# Patient Record
Sex: MALE | Race: WHITE | Employment: UNEMPLOYED | URBAN - METROPOLITAN AREA
[De-identification: names, ages, dates, MRNs, and addresses within clinical notes are randomized per-mention and may not be internally consistent; named-entity substitution may affect disease eponyms.]

---

## 2017-08-19 ENCOUNTER — HOSPITAL ENCOUNTER (INPATIENT)
Age: 82
LOS: 5 days | Discharge: HOME | DRG: 872 | End: 2017-08-25
Attending: EMERGENCY MEDICINE | Admitting: INTERNAL MEDICINE
Payer: MEDICARE

## 2017-08-19 ENCOUNTER — APPOINTMENT (OUTPATIENT)
Dept: GENERAL RADIOLOGY | Age: 82
DRG: 872 | End: 2017-08-19
Attending: STUDENT IN AN ORGANIZED HEALTH CARE EDUCATION/TRAINING PROGRAM
Payer: MEDICARE

## 2017-08-19 DIAGNOSIS — L03.115 CELLULITIS OF RIGHT LOWER EXTREMITY: ICD-10-CM

## 2017-08-19 DIAGNOSIS — A41.9 SEPSIS, DUE TO UNSPECIFIED ORGANISM: Primary | ICD-10-CM

## 2017-08-19 LAB
ALBUMIN SERPL-MCNC: 3.1 G/DL (ref 3.2–4.6)
ALBUMIN/GLOB SERPL: 0.7 {RATIO} (ref 1.2–3.5)
ALP SERPL-CCNC: 86 U/L (ref 50–136)
ALT SERPL-CCNC: 17 U/L (ref 12–65)
ANION GAP SERPL CALC-SCNC: 10 MMOL/L (ref 7–16)
AST SERPL-CCNC: 26 U/L (ref 15–37)
BASOPHILS # BLD: 0 K/UL (ref 0–0.2)
BASOPHILS NFR BLD: 0 % (ref 0–2)
BILIRUB SERPL-MCNC: 2.2 MG/DL (ref 0.2–1.1)
BUN SERPL-MCNC: 26 MG/DL (ref 8–23)
CALCIUM SERPL-MCNC: 9.2 MG/DL (ref 8.3–10.4)
CHLORIDE SERPL-SCNC: 99 MMOL/L (ref 98–107)
CO2 SERPL-SCNC: 28 MMOL/L (ref 21–32)
CREAT SERPL-MCNC: 1.36 MG/DL (ref 0.8–1.5)
DIFFERENTIAL METHOD BLD: ABNORMAL
EOSINOPHIL # BLD: 0 K/UL (ref 0–0.8)
EOSINOPHIL NFR BLD: 0 % (ref 0.5–7.8)
ERYTHROCYTE [DISTWIDTH] IN BLOOD BY AUTOMATED COUNT: 16.4 % (ref 11.9–14.6)
GLOBULIN SER CALC-MCNC: 4.4 G/DL (ref 2.3–3.5)
GLUCOSE SERPL-MCNC: 94 MG/DL (ref 65–100)
HCT VFR BLD AUTO: 40 % (ref 41.1–50.3)
HGB BLD-MCNC: 13.5 G/DL (ref 13.6–17.2)
IMM GRANULOCYTES # BLD: 0.2 K/UL (ref 0–0.5)
IMM GRANULOCYTES NFR BLD: 0.8 % (ref 0–5)
LACTATE BLD-SCNC: 1.8 MMOL/L (ref 0.5–1.9)
LYMPHOCYTES # BLD: 0.3 K/UL (ref 0.5–4.6)
LYMPHOCYTES NFR BLD: 1 % (ref 13–44)
MCH RBC QN AUTO: 27.2 PG (ref 26.1–32.9)
MCHC RBC AUTO-ENTMCNC: 33.8 G/DL (ref 31.4–35)
MCV RBC AUTO: 80.6 FL (ref 79.6–97.8)
MONOCYTES # BLD: 0.6 K/UL (ref 0.1–1.3)
MONOCYTES NFR BLD: 3 % (ref 4–12)
NEUTS SEG # BLD: 20.5 K/UL (ref 1.7–8.2)
NEUTS SEG NFR BLD: 95 % (ref 43–78)
PLATELET # BLD AUTO: 154 K/UL (ref 150–450)
PMV BLD AUTO: 9.9 FL (ref 10.8–14.1)
POTASSIUM SERPL-SCNC: 3.6 MMOL/L (ref 3.5–5.1)
PROCALCITONIN SERPL-MCNC: 5.6 NG/ML
PROT SERPL-MCNC: 7.5 G/DL (ref 6.3–8.2)
RBC # BLD AUTO: 4.96 M/UL (ref 4.23–5.67)
SODIUM SERPL-SCNC: 137 MMOL/L (ref 136–145)
TROPONIN I BLD-MCNC: 0.05 NG/ML (ref 0–0.08)
WBC # BLD AUTO: 21.6 K/UL (ref 4.3–11.1)

## 2017-08-19 PROCEDURE — 87205 SMEAR GRAM STAIN: CPT | Performed by: STUDENT IN AN ORGANIZED HEALTH CARE EDUCATION/TRAINING PROGRAM

## 2017-08-19 PROCEDURE — 84145 PROCALCITONIN (PCT): CPT | Performed by: STUDENT IN AN ORGANIZED HEALTH CARE EDUCATION/TRAINING PROGRAM

## 2017-08-19 PROCEDURE — 87641 MR-STAPH DNA AMP PROBE: CPT | Performed by: STUDENT IN AN ORGANIZED HEALTH CARE EDUCATION/TRAINING PROGRAM

## 2017-08-19 PROCEDURE — 83605 ASSAY OF LACTIC ACID: CPT

## 2017-08-19 PROCEDURE — 87186 SC STD MICRODIL/AGAR DIL: CPT | Performed by: STUDENT IN AN ORGANIZED HEALTH CARE EDUCATION/TRAINING PROGRAM

## 2017-08-19 PROCEDURE — 96374 THER/PROPH/DIAG INJ IV PUSH: CPT | Performed by: STUDENT IN AN ORGANIZED HEALTH CARE EDUCATION/TRAINING PROGRAM

## 2017-08-19 PROCEDURE — 71010 XR CHEST PORT: CPT

## 2017-08-19 PROCEDURE — 96361 HYDRATE IV INFUSION ADD-ON: CPT | Performed by: STUDENT IN AN ORGANIZED HEALTH CARE EDUCATION/TRAINING PROGRAM

## 2017-08-19 PROCEDURE — 87040 BLOOD CULTURE FOR BACTERIA: CPT | Performed by: STUDENT IN AN ORGANIZED HEALTH CARE EDUCATION/TRAINING PROGRAM

## 2017-08-19 PROCEDURE — 85025 COMPLETE CBC W/AUTO DIFF WBC: CPT | Performed by: STUDENT IN AN ORGANIZED HEALTH CARE EDUCATION/TRAINING PROGRAM

## 2017-08-19 PROCEDURE — 93005 ELECTROCARDIOGRAM TRACING: CPT | Performed by: STUDENT IN AN ORGANIZED HEALTH CARE EDUCATION/TRAINING PROGRAM

## 2017-08-19 PROCEDURE — 87077 CULTURE AEROBIC IDENTIFY: CPT | Performed by: STUDENT IN AN ORGANIZED HEALTH CARE EDUCATION/TRAINING PROGRAM

## 2017-08-19 PROCEDURE — 84484 ASSAY OF TROPONIN QUANT: CPT

## 2017-08-19 PROCEDURE — 99284 EMERGENCY DEPT VISIT MOD MDM: CPT | Performed by: STUDENT IN AN ORGANIZED HEALTH CARE EDUCATION/TRAINING PROGRAM

## 2017-08-19 PROCEDURE — 81001 URINALYSIS AUTO W/SCOPE: CPT | Performed by: EMERGENCY MEDICINE

## 2017-08-19 PROCEDURE — 80053 COMPREHEN METABOLIC PANEL: CPT | Performed by: STUDENT IN AN ORGANIZED HEALTH CARE EDUCATION/TRAINING PROGRAM

## 2017-08-19 PROCEDURE — 77030027138 HC INCENT SPIROMETER -A

## 2017-08-19 RX ORDER — VANCOMYCIN 2 GRAM/500 ML IN 0.9 % SODIUM CHLORIDE INTRAVENOUS
2000 ONCE
Status: COMPLETED | OUTPATIENT
Start: 2017-08-19 | End: 2017-08-20

## 2017-08-19 NOTE — IP AVS SNAPSHOT
[address] 
[phone] Patient: Anna Rivera MRN: [id number] SYV:3/5/0251 You are allergic to the following Allergen Reactions Demerol (Meperidine) Nausea and Vomiting Recent Documentation Height Weight BMI  
  
  
 1.956 m 120.2 kg 31.42 kg/m2 Unresulted Labs Order Current Status CULTURE, BLOOD Preliminary result Emergency Contacts Name Discharge Info Relation Home Work [facility]   [phone] About your hospitalization You were admitted on:  August 20, 2017 You last received care in the:  [facility] You were discharged on:  August 25, 2017 Unit phone number:  [phone] Why you were hospitalized Your primary diagnosis was:  Cellulitis Of Right Lower Leg Your diagnoses also included:  Bacteremia, Umbilical Hernia, Hypertension, Chronic Atrial Fibrillation (Hcc), Sepsis Due To Cellulitis (Hcc) Providers Seen During Your Hospitalizations Provider Role Specialty Primary office phone Mark Farmer MD Attending Provider Emergency Medicine [phone] Ren Hoyt MD Attending Provider Internal Medicine [phone] Your Primary Care Physician (PCP) Primary Care Physician Office Phone Office Fax UNKNOWN, PROVIDER ** None ** ** None ** Follow-up Information Follow up With Details Comments Contact Info Papo Foster MD On 8/30/2017 10:15 am  [address] [address] 
[phone] Current Discharge Medication List  
  
START taking these medications Dose & Instructions Dispensing Information Comments Morning Noon Evening Bedtime  
 clindamycin 300 mg capsule Commonly known as:  CLEOCIN Dose:  300 mg Take 1 Cap by mouth three (3) times daily for 8 days. Quantity:  24 Cap Refills:  0 Lactobacillus Acidoph & Bulgar 1 million cell Tab tablet Commonly known as:  Gisela Joyce Dose:  1 Tab Take 1 Tab by mouth two (2) times a day for 10 days. Quantity:  20 Tab Refills:  0 CONTINUE these medications which have NOT CHANGED Dose & Instructions Dispensing Information Comments Morning Noon Evening Bedtime * COUMADIN 7.5 mg tablet Generic drug:  warfarin Notes to Patient:  As schedule Dose:  10 mg Take 10 mg by mouth DIALYSIS MON, WED & FRI. Refills:  0  
     
   
   
   
  
 * warfarin 7.5 mg tablet Commonly known as:  COUMADIN Notes to Patient:  As schedule Dose:  7.5 mg Take 7.5 mg by mouth every Tuesday, Thursday, Saturday & Sunday. Refills:  0  
     
   
   
   
  
 diphenhydrAMINE-zinc acetate 2%-0.1% 2-0.1 % topical cream  
Commonly known as:  BENADRYL ITCH STOPPING Notes to Patient:  Take on as needed schedule Apply  to affected area two (2) times daily as needed for Itching. Quantity:  30 g Refills:  0  
     
   
   
   
  
 gabapentin 100 mg capsule Commonly known as:  NEURONTIN Dose:  100 mg Take 100 mg by mouth nightly. 1-4 tabs Refills:  0  
     
   
   
   
  
  
 hydrocortisone 2.5 % topical cream  
Commonly known as:  HYTONE Apply  to affected area two (2) times a day. use thin layer Quantity:  30 g Refills:  0  
     
  
   
   
   
  
  
 LASIX 20 mg tablet Generic drug:  furosemide Dose:  20 mg Take 20 mg by mouth two (2) times a day. Refills:  0  
     
  
   
   
  
   
  
 lisinopril 20 mg tablet Commonly known as:  Karn Desanctis Dose:  10 mg Take 10 mg by mouth two (2) times a day. Refills:  0 LOPRESSOR 50 mg tablet Generic drug:  metoprolol tartrate Dose:  25 mg Take 25 mg by mouth two (2) times a day. Refills:  0 NORVASC 10 mg tablet Generic drug:  amLODIPine Dose:  10 mg Take 10 mg by mouth daily. Refills:  0  
     
  
   
   
   
  
 oxyCODONE IR 15 mg immediate release tablet Commonly known as:  OXY-IR Notes to Patient:  Take on as needed schedule Dose:  15 mg Take 15 mg by mouth every eight (8) hours as needed. Refills:  0 PriLOSEC 20 mg capsule Generic drug:  omeprazole Dose:  20 mg Take 20 mg by mouth daily. Refills:  0  
     
  
   
   
   
  
 * Notice: This list has 2 medication(s) that are the same as other medications prescribed for you. Read the directions carefully, and ask your doctor or other care provider to review them with you. Where to Get Your Medications These medications were sent to [facility] [address] [place], [address]  [address] [facility], [address] [place] [address] Phone:  [phone]  
  clindamycin 300 mg capsule Lactobacillus Acidoph & Bulgar 1 million cell Tab tablet Discharge Instructions DISCHARGE SUMMARY from Nurse The following personal items are in your possession at time of discharge: 
 
Dental Appliances: None Home Medications: None Jewelry: None Clothing: With patient, Socks, Shirt, Pants, Footwear Other Valuables: At bedside PATIENT INSTRUCTIONS: 
 
After general anesthesia or intravenous sedation, for 24 hours or while taking prescription Narcotics: · Limit your activities · Do not drive and operate hazardous machinery · Do not make important personal or business decisions · Do  not drink alcoholic beverages · If you have not urinated within 8 hours after discharge, please contact your surgeon on call. Report the following to your surgeon: 
· Excessive pain, swelling, redness or odor of or around the surgical area · Temperature over 100.5 · Nausea and vomiting lasting longer than 4 hours or if unable to take medications · Any signs of decreased circulation or nerve impairment to extremity: change in color, persistent  numbness, tingling, coldness or increase pain · Any questions What to do at Home: 
Recommended activity: Activity as tolerated, per MD instructions If you experience any of the following symptoms fever > 100.5, nausea, vomiting, pain, chest pain, shortness of breath please follow up with MD. 
 
 
*  Please give a list of your current medications to your Primary Care Provider. *  Please update this list whenever your medications are discontinued, doses are 
    changed, or new medications (including over-the-counter products) are added. *  Please carry medication information at all times in case of emergency situations. These are general instructions for a healthy lifestyle: No smoking/ No tobacco products/ Avoid exposure to second hand smoke Surgeon General's Warning:  Quitting smoking now greatly reduces serious risk to your health. Obesity, smoking, and sedentary lifestyle greatly increases your risk for illness A healthy diet, regular physical exercise & weight monitoring are important for maintaining a healthy lifestyle You may be retaining fluid if you have a history of heart failure or if you experience any of the following symptoms:  Weight gain of 3 pounds or more overnight or 5 pounds in a week, increased swelling in our hands or feet or shortness of breath while lying flat in bed. Please call your doctor as soon as you notice any of these symptoms; do not wait until your next office visit. Recognize signs and symptoms of STROKE: 
 
F-face looks uneven A-arms unable to move or move unevenly S-speech slurred or non-existent T-time-call 911 as soon as signs and symptoms begin-DO NOT go Back to bed or wait to see if you get better-TIME IS BRAIN. Warning Signs of HEART ATTACK Call 911 if you have these symptoms: ? Chest discomfort. Most heart attacks involve discomfort in the center of the chest that lasts more than a few minutes, or that goes away and comes back. It can feel like uncomfortable pressure, squeezing, fullness, or pain. ? Discomfort in other areas of the upper body. Symptoms can include pain or discomfort in one or both arms, the back, neck, jaw, or stomach. ? Shortness of breath with or without chest discomfort. ? Other signs may include breaking out in a cold sweat, nausea, or lightheadedness. Don't wait more than five minutes to call [address]! Fast action can save your life. Calling 911 is almost always the fastest way to get lifesaving treatment. Emergency Medical Services staff can begin treatment when they arrive  up to an hour sooner than if someone gets to the hospital by car. The discharge information has been reviewed with the patient. The patient verbalized understanding. Discharge medications reviewed with the patient and appropriate educational materials and side effects teaching were provided. Cellulitis: Care Instructions Your Care Instructions Cellulitis is a skin infection. It often occurs after a break in the skin from a scrape, cut, bite, or puncture, or after a rash. The doctor has checked you carefully, but problems can develop later. If you notice any problems or new symptoms, get medical treatment right away. Follow-up care is a key part of your treatment and safety. Be sure to make and go to all appointments, and call your doctor if you are having problems. It's also a good idea to know your test results and keep a list of the medicines you take. How can you care for yourself at home? · Take your antibiotics as directed. Do not stop taking them just because you feel better. You need to take the full course of antibiotics. · Prop up the infected area on pillows to reduce pain and swelling.  Try to keep the area above the level of your heart as often as you can. · If your doctor told you how to care for your wound, follow your doctor's instructions. If you did not get instructions, follow this general advice: ¨ Wash the wound with clean water 2 times a day. Don't use hydrogen peroxide or alcohol, which can slow healing. ¨ You may cover the wound with a thin layer of petroleum jelly, such as Vaseline, and a nonstick bandage. ¨ Apply more petroleum jelly and replace the bandage as needed. · Be safe with medicines. Take pain medicines exactly as directed. ¨ If the doctor gave you a prescription medicine for pain, take it as prescribed. ¨ If you are not taking a prescription pain medicine, ask your doctor if you can take an over-the-counter medicine. To prevent cellulitis in the future · Try to prevent cuts, scrapes, or other injuries to your skin. Cellulitis most often occurs where there is a break in the skin. · If you get a scrape, cut, mild burn, or bite, wash the wound with clean water as soon as you can to help avoid infection. Don't use hydrogen peroxide or alcohol, which can slow healing. · If you have swelling in your legs (edema), support stockings and good skin care may help prevent leg sores and cellulitis. · Take care of your feet, especially if you have diabetes or other conditions that increase the risk of infection. Wear shoes and socks. Do not go barefoot. If you have athlete's foot or other skin problems on your feet, talk to your doctor about how to treat them. When should you call for help? Call your doctor now or seek immediate medical care if: 
· You have signs that your infection is getting worse, such as: 
¨ Increased pain, swelling, warmth, or redness. ¨ Red streaks leading from the area. ¨ Pus draining from the area. ¨ A fever. · You get a rash. Watch closely for changes in your health, and be sure to contact your doctor if: · You are not getting better after 1 day (24 hours). · You do not get better as expected. Where can you learn more? Go to http://mil-georgette.info/. Maico Sullivan in the search box to learn more about \"Cellulitis: Care Instructions. \" Current as of: October 13, 2016 Content Version: 11.3 © 6631-8904 [employer]. Care instructions adapted under license by [employer] (which disclaims liability or warranty for this information). If you have questions about a medical condition or this instruction, always ask your healthcare professional. [place] [address] any warranty or liability for your use of this information. Discharge Orders None [employer] Announcement We are excited to announce that we are making your provider's discharge notes available to you in [employer]. You will see these notes when they are completed and signed by the physician that discharged you from your recent hospital stay. If you have any questions or concerns about any information you see in [employer], please call the Health Information Department where you were seen or reach out to your Primary Care Provider for more information about your plan of care. Introducing [facility]! Yuliana Tobar introduces [employer] patient portal. Now you can access parts of your medical record, email your doctor's office, and request medication refills online. 1. In your internet browser, go to [email or web address] 2. Click on the First Time User? Click Here link in the Sign In box. You will see the New Member Sign Up page. 3. Enter your [employer] Access Code exactly as it appears below. You will not need to use this code after youve completed the sign-up process. If you do not sign up before the expiration date, you must request a new code. · [employer] Access Code: [email or web address] Expires: 11/17/2017 11:23 PM 
 
 4. Enter the last four digits of your Social Security Number (xxxx) and Date of Birth (mm/dd/yyyy) as indicated and click Submit. You will be taken to the next sign-up page. 5. Create a AXADO ID. This will be your AXADO login ID and cannot be changed, so think of one that is secure and easy to remember. 6. Create a AXADO password. You can change your password at any time. 7. Enter your Password Reset Question and Answer. This can be used at a later time if you forget your password. 8. Enter your e-mail address. You will receive e-mail notification when new information is available in [address] 9. Click Sign Up. You can now view and download portions of your medical record. 10. Click the Download Summary menu link to download a portable copy of your medical information. If you have questions, please visit the Frequently Asked Questions section of the AXADO website. Remember, AX[employer]O is NOT to be used for urgent needs. For medical emergencies, dial 911. Now available from your iPhone and Android! General Information Please provide this summary of care documentation to your next provider. Patient Signature:  ____________________________________________________________ Date:  ____________________________________________________________  
  
Maynor Endo Provider Signature:  ____________________________________________________________ Date:  ____________________________________________________________

## 2017-08-20 ENCOUNTER — APPOINTMENT (OUTPATIENT)
Dept: ULTRASOUND IMAGING | Age: 82
DRG: 872 | End: 2017-08-20
Attending: INTERNAL MEDICINE
Payer: MEDICARE

## 2017-08-20 PROBLEM — L03.90 CELLULITIS: Status: ACTIVE | Noted: 2017-08-20

## 2017-08-20 PROBLEM — R78.81 BACTEREMIA: Status: ACTIVE | Noted: 2017-08-20

## 2017-08-20 LAB
APPEARANCE UR: CLEAR
ATRIAL RATE: 131 BPM
BACTERIA URNS QL MICRO: 0 /HPF
BILIRUB UR QL: NEGATIVE
CALCULATED R AXIS, ECG10: 82 DEGREES
CALCULATED T AXIS, ECG11: 74 DEGREES
CASTS URNS QL MICRO: ABNORMAL /LPF
COLOR UR: YELLOW
DIAGNOSIS, 93000: NORMAL
EPI CELLS #/AREA URNS HPF: ABNORMAL /HPF
GLUCOSE UR STRIP.AUTO-MCNC: NEGATIVE MG/DL
HGB UR QL STRIP: ABNORMAL
INR PPP: 1.6 (ref 0.9–1.2)
KETONES UR QL STRIP.AUTO: NEGATIVE MG/DL
LEUKOCYTE ESTERASE UR QL STRIP.AUTO: NEGATIVE
NITRITE UR QL STRIP.AUTO: NEGATIVE
PH UR STRIP: 7.5 [PH] (ref 5–9)
PROT UR STRIP-MCNC: 30 MG/DL
PROTHROMBIN TIME: 17.4 SEC (ref 9.6–12)
Q-T INTERVAL, ECG07: 300 MS
QRS DURATION, ECG06: 88 MS
QTC CALCULATION (BEZET), ECG08: 408 MS
RBC #/AREA URNS HPF: ABNORMAL /HPF
SP GR UR REFRACTOMETRY: 1.02 (ref 1–1.02)
UROBILINOGEN UR QL STRIP.AUTO: 1 EU/DL (ref 0.2–1)
VENTRICULAR RATE, ECG03: 111 BPM
WBC URNS QL MICRO: ABNORMAL /HPF

## 2017-08-20 PROCEDURE — 85610 PROTHROMBIN TIME: CPT | Performed by: INTERNAL MEDICINE

## 2017-08-20 PROCEDURE — 36415 COLL VENOUS BLD VENIPUNCTURE: CPT | Performed by: INTERNAL MEDICINE

## 2017-08-20 PROCEDURE — 74011250636 HC RX REV CODE- 250/636: Performed by: INTERNAL MEDICINE

## 2017-08-20 PROCEDURE — 74011250636 HC RX REV CODE- 250/636: Performed by: EMERGENCY MEDICINE

## 2017-08-20 PROCEDURE — 74011250637 HC RX REV CODE- 250/637: Performed by: INTERNAL MEDICINE

## 2017-08-20 PROCEDURE — 74011250637 HC RX REV CODE- 250/637: Performed by: HOSPITALIST

## 2017-08-20 PROCEDURE — 74011000258 HC RX REV CODE- 258: Performed by: HOSPITALIST

## 2017-08-20 PROCEDURE — 65270000029 HC RM PRIVATE

## 2017-08-20 PROCEDURE — 74011000258 HC RX REV CODE- 258: Performed by: INTERNAL MEDICINE

## 2017-08-20 PROCEDURE — 74011250636 HC RX REV CODE- 250/636: Performed by: HOSPITALIST

## 2017-08-20 PROCEDURE — 93970 EXTREMITY STUDY: CPT

## 2017-08-20 PROCEDURE — 74011250637 HC RX REV CODE- 250/637: Performed by: EMERGENCY MEDICINE

## 2017-08-20 RX ORDER — NALOXONE HYDROCHLORIDE 0.4 MG/ML
0.4 INJECTION, SOLUTION INTRAMUSCULAR; INTRAVENOUS; SUBCUTANEOUS AS NEEDED
Status: DISCONTINUED | OUTPATIENT
Start: 2017-08-20 | End: 2017-08-25 | Stop reason: HOSPADM

## 2017-08-20 RX ORDER — SODIUM CHLORIDE 9 MG/ML
50 INJECTION, SOLUTION INTRAVENOUS CONTINUOUS
Status: DISPENSED | OUTPATIENT
Start: 2017-08-20 | End: 2017-08-21

## 2017-08-20 RX ORDER — PANTOPRAZOLE SODIUM 40 MG/1
40 TABLET, DELAYED RELEASE ORAL
Status: DISCONTINUED | OUTPATIENT
Start: 2017-08-20 | End: 2017-08-25 | Stop reason: HOSPADM

## 2017-08-20 RX ORDER — LORAZEPAM 1 MG/1
1 TABLET ORAL
Status: DISCONTINUED | OUTPATIENT
Start: 2017-08-20 | End: 2017-08-25 | Stop reason: HOSPADM

## 2017-08-20 RX ORDER — UREA 10 %
1 LOTION (ML) TOPICAL 2 TIMES DAILY
Status: DISCONTINUED | OUTPATIENT
Start: 2017-08-20 | End: 2017-08-25 | Stop reason: HOSPADM

## 2017-08-20 RX ORDER — ACETAMINOPHEN 325 MG/1
650 TABLET ORAL
Status: DISCONTINUED | OUTPATIENT
Start: 2017-08-20 | End: 2017-08-25 | Stop reason: HOSPADM

## 2017-08-20 RX ORDER — HYDROCODONE BITARTRATE AND ACETAMINOPHEN 5; 325 MG/1; MG/1
1 TABLET ORAL
Status: DISCONTINUED | OUTPATIENT
Start: 2017-08-20 | End: 2017-08-25 | Stop reason: HOSPADM

## 2017-08-20 RX ORDER — LISINOPRIL 5 MG/1
10 TABLET ORAL 2 TIMES DAILY
Status: DISCONTINUED | OUTPATIENT
Start: 2017-08-20 | End: 2017-08-25 | Stop reason: HOSPADM

## 2017-08-20 RX ORDER — VANCOMYCIN 2 GRAM/500 ML IN 0.9 % SODIUM CHLORIDE INTRAVENOUS
2000 EVERY 24 HOURS
Status: DISCONTINUED | OUTPATIENT
Start: 2017-08-21 | End: 2017-08-20

## 2017-08-20 RX ORDER — AMOXICILLIN 250 MG
2 CAPSULE ORAL
Status: DISCONTINUED | OUTPATIENT
Start: 2017-08-20 | End: 2017-08-25 | Stop reason: HOSPADM

## 2017-08-20 RX ORDER — DIAPER,BRIEF,INFANT-TODD,DISP
EACH MISCELLANEOUS 2 TIMES DAILY
Status: DISCONTINUED | OUTPATIENT
Start: 2017-08-20 | End: 2017-08-25 | Stop reason: HOSPADM

## 2017-08-20 RX ORDER — ACETAMINOPHEN 500 MG
1000 TABLET ORAL
Status: COMPLETED | OUTPATIENT
Start: 2017-08-20 | End: 2017-08-20

## 2017-08-20 RX ORDER — HEPARIN SODIUM 5000 [USP'U]/ML
5000 INJECTION, SOLUTION INTRAVENOUS; SUBCUTANEOUS EVERY 8 HOURS
Status: DISCONTINUED | OUTPATIENT
Start: 2017-08-20 | End: 2017-08-20

## 2017-08-20 RX ORDER — SODIUM CHLORIDE 0.9 % (FLUSH) 0.9 %
5-10 SYRINGE (ML) INJECTION EVERY 8 HOURS
Status: DISCONTINUED | OUTPATIENT
Start: 2017-08-20 | End: 2017-08-25 | Stop reason: HOSPADM

## 2017-08-20 RX ORDER — ONDANSETRON 2 MG/ML
4 INJECTION INTRAMUSCULAR; INTRAVENOUS
Status: DISCONTINUED | OUTPATIENT
Start: 2017-08-20 | End: 2017-08-25 | Stop reason: HOSPADM

## 2017-08-20 RX ORDER — ZOLPIDEM TARTRATE 5 MG/1
5 TABLET ORAL
Status: DISCONTINUED | OUTPATIENT
Start: 2017-08-20 | End: 2017-08-25 | Stop reason: HOSPADM

## 2017-08-20 RX ORDER — SODIUM CHLORIDE 0.9 % (FLUSH) 0.9 %
5-10 SYRINGE (ML) INJECTION AS NEEDED
Status: DISCONTINUED | OUTPATIENT
Start: 2017-08-20 | End: 2017-08-25 | Stop reason: HOSPADM

## 2017-08-20 RX ORDER — HYDROMORPHONE HYDROCHLORIDE 1 MG/ML
0.5 INJECTION, SOLUTION INTRAMUSCULAR; INTRAVENOUS; SUBCUTANEOUS
Status: DISCONTINUED | OUTPATIENT
Start: 2017-08-20 | End: 2017-08-22

## 2017-08-20 RX ORDER — VANCOMYCIN 2 GRAM/500 ML IN 0.9 % SODIUM CHLORIDE INTRAVENOUS
2000 EVERY 24 HOURS
Status: DISCONTINUED | OUTPATIENT
Start: 2017-08-21 | End: 2017-08-21

## 2017-08-20 RX ORDER — DIPHENHYDRAMINE HYDROCHLORIDE 50 MG/ML
12.5 INJECTION, SOLUTION INTRAMUSCULAR; INTRAVENOUS
Status: DISCONTINUED | OUTPATIENT
Start: 2017-08-20 | End: 2017-08-25 | Stop reason: HOSPADM

## 2017-08-20 RX ORDER — METOPROLOL TARTRATE 25 MG/1
25 TABLET, FILM COATED ORAL 2 TIMES DAILY
Status: DISCONTINUED | OUTPATIENT
Start: 2017-08-20 | End: 2017-08-25 | Stop reason: HOSPADM

## 2017-08-20 RX ORDER — AMLODIPINE BESYLATE 10 MG/1
10 TABLET ORAL DAILY
Status: DISCONTINUED | OUTPATIENT
Start: 2017-08-20 | End: 2017-08-25 | Stop reason: HOSPADM

## 2017-08-20 RX ORDER — HEPARIN SODIUM 5000 [USP'U]/ML
5000 INJECTION, SOLUTION INTRAVENOUS; SUBCUTANEOUS EVERY 8 HOURS
Status: DISCONTINUED | OUTPATIENT
Start: 2017-08-20 | End: 2017-08-20 | Stop reason: SDUPTHER

## 2017-08-20 RX ADMIN — SODIUM CHLORIDE 1000 ML: 900 INJECTION, SOLUTION INTRAVENOUS at 00:41

## 2017-08-20 RX ADMIN — HYDROCORTISONE: 5 CREAM TOPICAL at 08:11

## 2017-08-20 RX ADMIN — PIPERACILLIN SODIUM,TAZOBACTAM SODIUM 3.38 G: 3; .375 INJECTION, POWDER, FOR SOLUTION INTRAVENOUS at 03:25

## 2017-08-20 RX ADMIN — LISINOPRIL 10 MG: 5 TABLET ORAL at 08:06

## 2017-08-20 RX ADMIN — Medication 5 ML: at 12:05

## 2017-08-20 RX ADMIN — ZOLPIDEM TARTRATE 5 MG: 5 TABLET ORAL at 22:04

## 2017-08-20 RX ADMIN — LACTOBACILLUS TAB 1 TABLET: TAB at 17:27

## 2017-08-20 RX ADMIN — METOPROLOL TARTRATE 25 MG: 25 TABLET, FILM COATED ORAL at 08:06

## 2017-08-20 RX ADMIN — HYDROCORTISONE: 5 CREAM TOPICAL at 17:27

## 2017-08-20 RX ADMIN — METOPROLOL TARTRATE 25 MG: 25 TABLET, FILM COATED ORAL at 17:27

## 2017-08-20 RX ADMIN — AMLODIPINE BESYLATE 10 MG: 10 TABLET ORAL at 08:06

## 2017-08-20 RX ADMIN — ACETAMINOPHEN 1000 MG: 500 TABLET, FILM COATED ORAL at 01:17

## 2017-08-20 RX ADMIN — SODIUM CHLORIDE 75 ML/HR: 900 INJECTION, SOLUTION INTRAVENOUS at 03:21

## 2017-08-20 RX ADMIN — ACETAMINOPHEN 650 MG: 325 TABLET ORAL at 17:36

## 2017-08-20 RX ADMIN — Medication 10 ML: at 20:42

## 2017-08-20 RX ADMIN — WARFARIN SODIUM 7.5 MG: 5 TABLET ORAL at 21:59

## 2017-08-20 RX ADMIN — Medication 5 ML: at 03:23

## 2017-08-20 RX ADMIN — PIPERACILLIN SODIUM,TAZOBACTAM SODIUM 3.38 G: 3; .375 INJECTION, POWDER, FOR SOLUTION INTRAVENOUS at 20:41

## 2017-08-20 RX ADMIN — LISINOPRIL 10 MG: 5 TABLET ORAL at 17:27

## 2017-08-20 RX ADMIN — VANCOMYCIN HYDROCHLORIDE 2000 MG: 10 INJECTION, POWDER, LYOPHILIZED, FOR SOLUTION INTRAVENOUS at 01:07

## 2017-08-20 RX ADMIN — PANTOPRAZOLE SODIUM 40 MG: 40 TABLET, DELAYED RELEASE ORAL at 08:06

## 2017-08-20 RX ADMIN — PIPERACILLIN SODIUM,TAZOBACTAM SODIUM 3.38 G: 3; .375 INJECTION, POWDER, FOR SOLUTION INTRAVENOUS at 12:04

## 2017-08-20 NOTE — ED NOTES
TRANSFER - OUT REPORT:    Verbal report given to Moira Sharpe RN on Rima Alonso  being transferred to 2nd Floor for routine progression of care       Report consisted of patients Situation, Background, Assessment and   Recommendations(SBAR). Information from the following report(s) SBAR was reviewed with the receiving nurse. Lines:   Peripheral IV 08/20/17 Left Antecubital (Active)       Peripheral IV 08/20/17 Right Antecubital (Active)        Opportunity for questions and clarification was provided.       Patient transported with:   [employer]

## 2017-08-20 NOTE — ED PROVIDER NOTES
HPI Comments: Presents via EMS with complaint of fever and right leg pain. Patient has been sleepy. First of which is unknown and is no caregiver is here and he is a poor historian. Patient reports right leg pain and that he lives at home with his son. He denies wearing oxygen at home. He falls asleep easily and doesn't answer all of my questions. Patient is a 80 y.o. male presenting with fever. The history is provided by the patient and the EMS personnel. The history is limited by the condition of the patient and the absence of a caregiver. Fever    This is a new problem. The current episode started 12 to 24 hours ago. The problem occurs constantly. The problem has not changed since onset. Pertinent negatives include no chest pain, no shortness of breath and no urinary symptoms. Past Medical History:   Diagnosis Date    GERD (gastroesophageal reflux disease)     HTN (hypertension)     Renal insufficiency     Renal stone        Past Surgical History:   Procedure Laterality Date    HX BLADDER SUSPENSION      tack to L side    HX LITHOTRIPSY      HX ORTHOPAEDIC      jaw surgery         No family history on file. Social History     Social History    Marital status: SINGLE     Spouse name: N/A    Number of children: N/A    Years of education: N/A     Occupational History    Not on file. Social History Main Topics    Smoking status: Not on file    Smokeless tobacco: Not on file    Alcohol use Not on file    Drug use: Not on file    Sexual activity: Not on file     Other Topics Concern    Not on file     Social History Narrative    No narrative on file         ALLERGIES: Demerol [meperidine]    Review of Systems   Unable to perform ROS: Mental status change   Constitutional: Positive for fever. Respiratory: Negative for shortness of breath. Cardiovascular: Negative for chest pain.        Vitals:    08/19/17 2250   BP: 141/82   Pulse: (!) 114   Resp: 24   Temp: 99.5 °F (37.5 °C)   SpO2: 93%   Weight: 120.2 kg (265 lb)   Height: 6' 5\" (1.956 m)            Physical Exam   Constitutional: He appears well-developed and well-nourished. He appears lethargic. He appears toxic. He appears ill. He appears distressed. HENT:   Head: Normocephalic and atraumatic. Neck: Normal range of motion. Neck supple. Cardiovascular: Regular rhythm. Tachycardia present. Pulmonary/Chest: Effort normal and breath sounds normal. No respiratory distress. Abdominal: Soft. He exhibits no distension. There is no tenderness. There is no rebound and no guarding. Musculoskeletal: He exhibits edema and tenderness. Right lower extremity just below knee is erythematous and edematous. Tender to touch no abscess   Neurological: He appears lethargic. No cranial nerve deficit. Skin: Skin is warm and dry. He is not diaphoretic. There is erythema. Nursing note and vitals reviewed. MDM  Number of Diagnoses or Management Options  Cellulitis of right lower extremity:   Sepsis, due to unspecified organism [facility]):   Diagnosis management comments: Patient with A. Fib with RVR on EKG occasional PVC noted ST changes. His sources likely a cellulitis on his right lower extremity as is chest x-ray and urine are unremarkable. Given his fever elevated white count and pro-Travis he will need to stay in the hospital.  He already received Zosyn with EMS  And was given a dose of vancomycin here.     He has a nl lactic and hx of CHF so he was given a total 1500 cc of NS       Amount and/or Complexity of Data Reviewed  Clinical lab tests: ordered and reviewed  Review and summarize past medical records: yes  Discuss the patient with other providers: yes  Independent visualization of images, tracings, or specimens: yes    Risk of Complications, Morbidity, and/or Mortality  Presenting problems: high  Diagnostic procedures: moderate  Management options: high    Patient Progress  Patient progress: improved    ED Course Procedures

## 2017-08-20 NOTE — H&P
History and Physical    Patient: Armond Tarango MRN: [id number]  SSN: [id number]    YOB: 1934  Age: 80 y.o. Sex: male      Subjective:      Armond Tarango is a 80 y.o. male who presents with right leg pain, fever, and redness. No family members or staff present at time of exam- H&P details procured from conversation with ED physician and available information in EMR. Patient transported by EMS to ED, received an empiric dose of Zosyn. In ED noted to be febrile- temp at 100.5, with leukocytosis. Patient not a good historian, but he does relate that his right leg hurts, and he wants a cigarette. Past Medical History:   Diagnosis Date    GERD (gastroesophageal reflux disease)     HTN (hypertension)     Renal insufficiency     Renal stone      Past Surgical History:   Procedure Laterality Date    HX BLADDER SUSPENSION      tack to L side    HX LITHOTRIPSY      HX ORTHOPAEDIC      jaw surgery      No family history on file. Social History   Substance Use Topics    Smoking status: Not on file    Smokeless tobacco: Not on file    Alcohol use Not on file      Prior to Admission medications    Medication Sig Start Date End Date Taking? Authorizing Provider   amLODIPine (NORVASC) 10 mg tablet Take 10 mg by mouth daily. Valeriano Adams MD   omeprazole (PRILOSEC) 20 mg capsule Take 20 mg by mouth daily. Valeriano Adams MD   metoprolol (LOPRESSOR) 50 mg tablet Take 25 mg by mouth two (2) times a day. Valeriano Adams MD   warfarin (COUMADIN) 7.5 mg tablet Take 7.5 mg by mouth daily. Valeriano Adams MD   lisinopril (PRINIVIL, ZESTRIL) 20 mg tablet Take 10 mg by mouth two (2) times a day. Valeriano Adams MD   oxyCODONE IR (OXY-IR) 15 mg immediate release tablet Take 15 mg by mouth every eight (8) hours as needed. Valeriano Adams MD   hydrocortisone (HYTONE) 2.5 % topical cream Apply  to affected area two (2) times a day.  use thin layer 3/8/13   Naldo Steve MD   diphenhydrAMINE-zinc acetate 2%-0.1% (BENADRYL ITCH STOPPING) 2-0.1 % topical cream Apply  to affected area two (2) times daily as needed for Itching. 3/8/13   Naldo Steve MD        Allergies   Allergen Reactions    Demerol [Meperidine] Nausea and Vomiting       Review of Systems:  A comprehensive review of systems was negative except for that written in the History of Present Illness. Objective:     Vitals:    08/20/17 0000 08/20/17 0030 08/20/17 0044 08/20/17 0101   BP: 120/64 116/71  134/69   Pulse: 93 97  100   Resp: 26 24  13   Temp:   (!) 100.5 °F (38.1 °C)    SpO2: 93% 92%  95%   Weight:       Height:            Physical Exam:  General:  Weary, disheveled. Eyes:  Conjunctivae/corneas clear. PERRL, EOMs intact. Fundi benign   Ears:  Normal TMs and external ear canals both ears. Nose: Nares normal. Septum midline. Mucosa normal. No drainage or sinus tenderness. Mouth/Throat: Lips, mucosa, and tongue normal. Teeth and gums normal.   Neck: Supple, symmetrical, trachea midline, no adenopathy, thyroid: no enlargment/tenderness/nodules, no carotid bruit and no JVD. Lungs:   Clear to auscultation bilaterally. Heart:  Regular rate and rhythm, S1, S2 normal, no murmur, click, rub or gallop. Abdomen:   Markedly distended, taut; no masses appreciated. Bowel sounds auscultated. Extremities:  2+ LE edema; right leg with warmth and tenderness to palpation up to knee; no open sores or ulcerations observed, including soles. Pulses: 2+ and symmetric all extremities. Skin: Skin color, texture, turgor normal. No rashes or lesions   Lymph nodes: Cervical, supraclavicular, and axillary nodes normal.   Neurologic: Somnolent, but rouses to stimuli; PERRLA; +spontaneous movement.           Assessment:   Hospital Problems  Never Reviewed          Codes Class Noted POA    Cellulitis ICD-10-CM: L03.90  ICD-9-CM: 682.9  8/20/2017 Yes              Plan:     1) Cellulitis, right leg- clinical diagnosis, supported by WBC-21.6, elevated procalcitonin, fever, and symptoms. Blood culture collected. Continue empiric vanc & zoysn. I've ordered lower extremity Dopplers. 2) Hypertension- stable. Blood pressure under good control. Resuming home meds. 3) Edema- particularly in abdomen, but also in legs; reported history of CHF. I've ordered abdominal ultrasound and transthoracic echo. Breathing is stable, CXR was normal.     4) Chronic Kidney Disease, stage 3- +blood and protein in urine. I've ordered spot urine P:C and abdominal ultrasound. 5) FENGI- regular diet; low-dose maintenance IV fluids. 6) DVT prophylaxis- subQ heparin. 7) Code Status- FULL CODE.     Signed By: Bettina Perry MD     August 20, 2017

## 2017-08-20 NOTE — PROGRESS NOTES
Primary Nurse Trae Melo RN and KAILA Espinoza performed a dual skin assessment on this patient. Bilateral lower extremities noted to have poor hygiene; RLE 3+ pitting edema, discolored and red/hot to touch with multiple scabbed areas; LLE with slight edema, discolored skin and multiple scabbed areas. Sacral area red but blanchable.

## 2017-08-20 NOTE — PROGRESS NOTES
TRANSFER - IN REPORT:    Verbal report received from KAILA Guzman on Heather Morales  being received from ED for routine progression of care      Report consisted of patients Situation, Background, Assessment and   Recommendations(SBAR). Information from the following report(s) SBAR, ED Summary, Procedure Summary, Intake/Output and Recent Results was reviewed with the receiving nurse. Opportunity for questions and clarification was provided. Assessment completed upon patients arrival to unit and care assumed.

## 2017-08-20 NOTE — PROGRESS NOTES
US called this am to place patient NPO until after lunch, placed NPO sign on door, told assistant, as well as the patient that he can't eat until after procedure. Arrived to room to find NPO sign removed and patient had eaten lunch. US notified and stated will have to do abd US tomorrow but will be able to dopler. Will continue to monitor.

## 2017-08-20 NOTE — PROGRESS NOTES
Warfarin dosing per pharmacist    Demarco López is a 80 y.o. male. Height: 6' 5\" (195.6 cm)    Weight: 120.2 kg (265 lb)    Indication:  Chronic a fib and history of LE DVT    Goal INR:  2-3    Home dose:  Last notes in 2016: 10 mg on MWF and 7.5 mg S,T,Th,Sa    Risk factors or significant drug interactions:  --    Other anticoagulants:  Heparin sub q     Daily Monitoring  Date  INR     Warfarin dose HGB              Notes  8/20  1.6  7.5 mg  ---    Pharmacy consulted to dose home medication. Unsure indication, but previous outpatient encounters discusses chronic a fib with history of DVT. Pt picks up 5 mg tablets and last documented home dose is 10 mg M, W, F and 7.5 mg all other days. Will give 7.5 mg tonight and may need to increase if patient has been compliant and sub therapeutic. INR has been ordered daily.       Thank you,  Maria Luisa Hester, PharmD  Clinical Pharmacist  [phone]

## 2017-08-20 NOTE — PROGRESS NOTES
END OF SHIFT NOTE:    INTAKE/OUTPUT  08/19 0701 - 08/20 0700  In: -   Out: 600 [Urine:600]  Voiding: YES  Catheter: NO  Drain:              Flatus: Patient does have flatus present. Stool:  0 occurrences. Characteristics:       Emesis: 0 occurrences. Characteristics:        VITAL SIGNS  Patient Vitals for the past 12 hrs:   Temp Pulse Resp BP SpO2   08/20/17 0246 97.8 °F (36.6 °C) 98 20 120/62 94 %   08/20/17 0130 - 100 22 130/83 94 %   08/20/17 0101 - 100 13 134/69 95 %   08/20/17 0044 (!) 100.5 °F (38.1 °C) - - - -   08/20/17 0030 - 97 24 116/71 92 %   08/20/17 0000 - 93 26 120/64 93 %   08/19/17 2331 - (!) 115 19 146/74 96 %   08/19/17 2250 99.5 °F (37.5 °C) (!) 114 24 141/82 93 %       Pain Assessment  Pain Intensity 1: 8 (08/19/17 2250)  Pain Location 1: Leg          Ambulating  Yes    Shift report given to oncoming nurse at the bedside.     Olga Lidia Collazo RN

## 2017-08-20 NOTE — PROGRESS NOTES
Pharmacokinetic Consult to Pharmacist    Aristides Denver is a 80 y.o. male being treated for cellulitis and sepsis with vancomycin and zosyn. Height: 6' 5\" (195.6 cm)  Weight: 120.2 kg (265 lb)  Lab Results   Component Value Date/Time    BUN 26 08/19/2017 11:00 PM    Creatinine 1.36 08/19/2017 11:00 PM    WBC 21.6 08/19/2017 11:00 PM    Procalcitonin 5.6 08/19/2017 11:00 PM    Lactic Acid (POC) 1.8 08/19/2017 11:11 PM      Estimated Creatinine Clearance: 59.1 mL/min (based on Cr of 1.36). CULTURES:  Pending. Day 1 of vancomycin. Goal trough is 15-20. Vancomycin dose initiated at 2g q 24hr. Will continue to follow patient.       Thank you,  Jovon De Paz, PharmD  Clinical Pharmacist  [phone]

## 2017-08-20 NOTE — PROGRESS NOTES
Progress Note    Patient: Nettie Lewis MRN: [id number]  SSN: [id number]    YOB: 1934  Age: 80 y.o. Sex: male      Admit Date: 8/19/2017    LOS: 0 days     Subjective:     Patient presented with right leg cellulitis. 8/20: One tube of blood culture is positive for gram cocci. Objective:     Vitals:    08/20/17 0130 08/20/17 0246 08/20/17 0710 08/20/17 1120   BP: 130/83 120/62 138/76 111/66   Pulse: 100 98 99 85   Resp: 22 20 19 19   Temp:  97.8 °F (36.6 °C) 98.1 °F (36.7 °C) 98.5 °F (36.9 °C)   SpO2: 94% 94% 96% 96%   Weight:       Height:            Intake and Output:  Current Shift: 08/20 0701 - 08/20 1900  In: 799 [P.O.:350; I.V.:579]  Out: 1 [Urine:1]  Last three shifts: 08/18 1901 - 08/20 0700  In: -   Out: 600 [Urine:600]    Physical Exam:   General:  Alert, cooperative, no distress, appears stated age. Eyes:  Conjunctivae/corneas clear. PERRL, EOMs intact. Fundi benign   Ears:  Normal TMs and external ear canals both ears. Nose: Nares normal. Septum midline. Mucosa normal. No drainage or sinus tenderness. Mouth/Throat: Lips, mucosa, and tongue normal. Teeth and gums normal.   Neck: Supple, symmetrical, trachea midline, no adenopathy, thyroid: no enlargment/tenderness/nodules, no carotid bruit and no JVD. Back:   Symmetric, no curvature. ROM normal. No CVA tenderness. Lungs:   Clear to auscultation bilaterally. Heart:  Regular rate and rhythm, S1, S2 normal, no murmur, click, rub or gallop. Abdomen:   Soft, non-tender. Bowel sounds normal. No masses,  No organomegaly. Extremities: Right leg larger than left leg. Left leg 1+edema, right leg 2+edema   Pulses: 2+ and symmetric all extremities. Skin: Skin color, texture, turgor normal. Chronic skin changes legs. Lymph nodes: Cervical, supraclavicular, and axillary nodes normal.   Neurologic: CNII-XII intact. Normal strength, sensation and reflexes throughout. Lab/Data Review:   All lab results for the last 24 hours reviewed. Blood culture positive    Assessment:   Active Problems:    Cellulitis (8/20/2017)      Bacteremia (8/20/2017)        Plan:     1) Cellulitis, right leg / Bacteremia  - Blood culture f/u sensitivities  - repeat blood cultures in AM  - Continue empiric vanc & zosyn. - lower extremity Dopplers negative for DVT     2) Hypertension- stable. Blood pressure under good control. Resuming home meds.    3) Edema- particularly in abdomen, but also in legs; reported history of CHF. I've ordered abdominal ultrasound and transthoracic echo. Breathing is stable, CXR was normal.      4) Chronic Kidney Disease, stage 3  - f/u abdominal ultrasound.    5) FEN / GI- regular diet; low-dose maintenance IV fluids.    6) DVT prophylaxis- subQ heparin.    7) Code Status- FULL CODE.     Signed By: Phill Jiménez MD     August 20, 2017

## 2017-08-20 NOTE — ED NOTES
Pt resting supine with eyes closed; respirations of 14 at this time. Will continue to closely monitor and assess.

## 2017-08-20 NOTE — PROGRESS NOTES
Critical value called from lab of gram positive cocci in anaerobic bottle. Relayed info to MD. Will continue to monitor and assess.

## 2017-08-21 ENCOUNTER — APPOINTMENT (OUTPATIENT)
Dept: ULTRASOUND IMAGING | Age: 82
DRG: 872 | End: 2017-08-21
Attending: INTERNAL MEDICINE
Payer: MEDICARE

## 2017-08-21 LAB
ANION GAP SERPL CALC-SCNC: 9 MMOL/L (ref 7–16)
BASOPHILS # BLD: 0 K/UL (ref 0–0.2)
BASOPHILS NFR BLD: 0 % (ref 0–2)
BUN SERPL-MCNC: 21 MG/DL (ref 8–23)
CALCIUM SERPL-MCNC: 9.1 MG/DL (ref 8.3–10.4)
CHLORIDE SERPL-SCNC: 104 MMOL/L (ref 98–107)
CO2 SERPL-SCNC: 25 MMOL/L (ref 21–32)
CREAT SERPL-MCNC: 1.04 MG/DL (ref 0.8–1.5)
DIFFERENTIAL METHOD BLD: ABNORMAL
EOSINOPHIL # BLD: 0 K/UL (ref 0–0.8)
EOSINOPHIL NFR BLD: 0 % (ref 0.5–7.8)
ERYTHROCYTE [DISTWIDTH] IN BLOOD BY AUTOMATED COUNT: 16.6 % (ref 11.9–14.6)
GLUCOSE SERPL-MCNC: 95 MG/DL (ref 65–100)
HCT VFR BLD AUTO: 36.6 % (ref 41.1–50.3)
HGB BLD-MCNC: 11.6 G/DL (ref 13.6–17.2)
IMM GRANULOCYTES # BLD: 0.1 K/UL (ref 0–0.5)
IMM GRANULOCYTES NFR BLD: 0.3 % (ref 0–5)
INR PPP: 1.5 (ref 0.9–1.2)
LYMPHOCYTES # BLD: 0.9 K/UL (ref 0.5–4.6)
LYMPHOCYTES NFR BLD: 5 % (ref 13–44)
MCH RBC QN AUTO: 26 PG (ref 26.1–32.9)
MCHC RBC AUTO-ENTMCNC: 31.7 G/DL (ref 31.4–35)
MCV RBC AUTO: 82.1 FL (ref 79.6–97.8)
MONOCYTES # BLD: 0.8 K/UL (ref 0.1–1.3)
MONOCYTES NFR BLD: 5 % (ref 4–12)
NEUTS SEG # BLD: 16.5 K/UL (ref 1.7–8.2)
NEUTS SEG NFR BLD: 90 % (ref 43–78)
PLATELET # BLD AUTO: 134 K/UL (ref 150–450)
PMV BLD AUTO: 10.2 FL (ref 10.8–14.1)
POTASSIUM SERPL-SCNC: 3.4 MMOL/L (ref 3.5–5.1)
PROTHROMBIN TIME: 16 SEC (ref 9.6–12)
RBC # BLD AUTO: 4.46 M/UL (ref 4.23–5.67)
SODIUM SERPL-SCNC: 138 MMOL/L (ref 136–145)
WBC # BLD AUTO: 18.4 K/UL (ref 4.3–11.1)

## 2017-08-21 PROCEDURE — 74011250637 HC RX REV CODE- 250/637: Performed by: INTERNAL MEDICINE

## 2017-08-21 PROCEDURE — 74011250636 HC RX REV CODE- 250/636: Performed by: HOSPITALIST

## 2017-08-21 PROCEDURE — 80048 BASIC METABOLIC PNL TOTAL CA: CPT | Performed by: INTERNAL MEDICINE

## 2017-08-21 PROCEDURE — 85025 COMPLETE CBC W/AUTO DIFF WBC: CPT | Performed by: INTERNAL MEDICINE

## 2017-08-21 PROCEDURE — 36415 COLL VENOUS BLD VENIPUNCTURE: CPT | Performed by: INTERNAL MEDICINE

## 2017-08-21 PROCEDURE — 85610 PROTHROMBIN TIME: CPT | Performed by: INTERNAL MEDICINE

## 2017-08-21 PROCEDURE — 74011250636 HC RX REV CODE- 250/636: Performed by: INTERNAL MEDICINE

## 2017-08-21 PROCEDURE — 74011250637 HC RX REV CODE- 250/637: Performed by: HOSPITALIST

## 2017-08-21 PROCEDURE — 87040 BLOOD CULTURE FOR BACTERIA: CPT | Performed by: HOSPITALIST

## 2017-08-21 PROCEDURE — 76700 US EXAM ABDOM COMPLETE: CPT

## 2017-08-21 PROCEDURE — 65270000029 HC RM PRIVATE

## 2017-08-21 PROCEDURE — 74011000258 HC RX REV CODE- 258: Performed by: HOSPITALIST

## 2017-08-21 PROCEDURE — 74011000250 HC RX REV CODE- 250: Performed by: INTERNAL MEDICINE

## 2017-08-21 PROCEDURE — C8929 TTE W OR WO FOL WCON,DOPPLER: HCPCS

## 2017-08-21 RX ORDER — ENOXAPARIN SODIUM 150 MG/ML
120 INJECTION SUBCUTANEOUS
Status: COMPLETED | OUTPATIENT
Start: 2017-08-21 | End: 2017-08-21

## 2017-08-21 RX ORDER — VANCOMYCIN 2 GRAM/500 ML IN 0.9 % SODIUM CHLORIDE INTRAVENOUS
2000 EVERY 24 HOURS
Status: DISCONTINUED | OUTPATIENT
Start: 2017-08-22 | End: 2017-08-22

## 2017-08-21 RX ORDER — VANCOMYCIN/0.9 % SOD CHLORIDE 1.5G/250ML
1500 PLASTIC BAG, INJECTION (ML) INTRAVENOUS EVERY 12 HOURS
Status: DISCONTINUED | OUTPATIENT
Start: 2017-08-21 | End: 2017-08-21

## 2017-08-21 RX ADMIN — VANCOMYCIN HYDROCHLORIDE 2000 MG: 10 INJECTION, POWDER, LYOPHILIZED, FOR SOLUTION INTRAVENOUS at 01:14

## 2017-08-21 RX ADMIN — Medication 10 ML: at 23:52

## 2017-08-21 RX ADMIN — METOPROLOL TARTRATE 25 MG: 25 TABLET, FILM COATED ORAL at 09:49

## 2017-08-21 RX ADMIN — HYDROCORTISONE: 5 CREAM TOPICAL at 09:00

## 2017-08-21 RX ADMIN — HYDROCODONE BITARTRATE AND ACETAMINOPHEN 1 TABLET: 5; 325 TABLET ORAL at 00:29

## 2017-08-21 RX ADMIN — Medication 10 ML: at 05:09

## 2017-08-21 RX ADMIN — CEFAZOLIN SODIUM 1 G: 1 INJECTION, POWDER, FOR SOLUTION INTRAMUSCULAR; INTRAVENOUS at 08:00

## 2017-08-21 RX ADMIN — WARFARIN SODIUM 7.5 MG: 5 TABLET ORAL at 23:42

## 2017-08-21 RX ADMIN — LISINOPRIL 10 MG: 5 TABLET ORAL at 17:17

## 2017-08-21 RX ADMIN — PIPERACILLIN SODIUM,TAZOBACTAM SODIUM 3.38 G: 3; .375 INJECTION, POWDER, FOR SOLUTION INTRAVENOUS at 05:09

## 2017-08-21 RX ADMIN — CEFAZOLIN SODIUM 1 G: 1 INJECTION, POWDER, FOR SOLUTION INTRAMUSCULAR; INTRAVENOUS at 23:44

## 2017-08-21 RX ADMIN — LACTOBACILLUS TAB 1 TABLET: TAB at 09:49

## 2017-08-21 RX ADMIN — HYDROCODONE BITARTRATE AND ACETAMINOPHEN 1 TABLET: 5; 325 TABLET ORAL at 20:53

## 2017-08-21 RX ADMIN — HYDROMORPHONE HYDROCHLORIDE 0.5 MG: 1 INJECTION, SOLUTION INTRAMUSCULAR; INTRAVENOUS; SUBCUTANEOUS at 23:57

## 2017-08-21 RX ADMIN — LISINOPRIL 10 MG: 5 TABLET ORAL at 09:50

## 2017-08-21 RX ADMIN — ENOXAPARIN SODIUM 120 MG: 120 INJECTION SUBCUTANEOUS at 18:44

## 2017-08-21 RX ADMIN — PERFLUTREN 1 ML: 6.52 INJECTION, SUSPENSION INTRAVENOUS at 10:00

## 2017-08-21 RX ADMIN — AMLODIPINE BESYLATE 10 MG: 10 TABLET ORAL at 09:50

## 2017-08-21 RX ADMIN — METOPROLOL TARTRATE 25 MG: 25 TABLET, FILM COATED ORAL at 17:17

## 2017-08-21 RX ADMIN — CEFAZOLIN SODIUM 1 G: 1 INJECTION, POWDER, FOR SOLUTION INTRAMUSCULAR; INTRAVENOUS at 15:43

## 2017-08-21 RX ADMIN — ZOLPIDEM TARTRATE 5 MG: 5 TABLET ORAL at 23:42

## 2017-08-21 RX ADMIN — PANTOPRAZOLE SODIUM 40 MG: 40 TABLET, DELAYED RELEASE ORAL at 09:49

## 2017-08-21 RX ADMIN — HYDROCORTISONE: 5 CREAM TOPICAL at 18:00

## 2017-08-21 RX ADMIN — LACTOBACILLUS TAB 1 TABLET: TAB at 17:17

## 2017-08-21 NOTE — PROGRESS NOTES
Progress Note    Patient: Alok Solis MRN: [id number]  SSN: [id number]    YOB: 1934  Age: 80 y.o. Sex: male      Admit Date: 8/19/2017    LOS: 1 day     Subjective:     Patient presented with right leg cellulitis. 8/20: One tube of blood culture is positive for gram cocci. 8/21: positive cultures and pain in right leg    Objective:     Vitals:    08/21/17 0400 08/21/17 0748 08/21/17 1145 08/21/17 1533   BP: 130/77 133/71 123/77 130/67   Pulse: 95 74 66 69   Resp: 20 16 16 16   Temp: 98 °F (36.7 °C) 98.1 °F (36.7 °C) 97.9 °F (36.6 °C) 97.9 °F (36.6 °C)   SpO2: 93% 91% 92% 93%   Weight:       Height:            Intake and Output:  Current Shift: 08/21 0701 - 08/21 1900  In: 266 [I.V.:266]  Out: 250 [Urine:250]  Last three shifts: 08/19 1901 - 08/21 0700  In: 2008 [P.O.:350; I.V.:1658]  Out: 801 [Urine:801]    Physical Exam:   General:  Alert, cooperative, no distress, appears stated age. Eyes:  Conjunctivae/corneas clear. PERRL, EOMs intact. Fundi benign   Ears:  Normal TMs and external ear canals both ears. Nose: Nares normal. Septum midline. Mucosa normal. No drainage or sinus tenderness. Mouth/Throat: Lips, mucosa, and tongue normal. Teeth and gums normal.   Neck: Supple, symmetrical, trachea midline, no adenopathy, thyroid: no enlargment/tenderness/nodules, no carotid bruit and no JVD. Back:   Symmetric, no curvature. ROM normal. No CVA tenderness. Lungs:   Clear to auscultation bilaterally. Heart:  Regular rate and rhythm, S1, S2 normal, no murmur, click, rub or gallop. Abdomen:   Soft, non-tender. Bowel sounds normal. No masses,  No organomegaly. Extremities: Right leg larger than left leg. Left leg 1+edema, right leg 2+edema. Chronic skin changes in legs   Pulses: 2+ and symmetric all extremities. Skin: Skin color, texture, turgor normal. Chronic skin changes legs. Lymph nodes: Cervical, supraclavicular, and axillary nodes normal.   Neurologic: CNII-XII intact. Normal strength, sensation and reflexes throughout. Lab/Data Review: All lab results for the last 24 hours reviewed. Blood culture positive    Assessment:   Principal Problem:    Cellulitis (8/20/2017)    Active Problems:    Bacteremia (8/20/2017)        Plan:     1) Cellulitis, right leg / Bacteremia  - Blood culture one set shows Beta hemolytic Strep  - repeat blood cultures sent on 8/21, f/u  - Continue empiric vanc  - changed Zosyn to ANCEF for Beta Heme Strep  - lower extremity Dopplers negative for DVT     2) Hypertension- stable. Blood pressure under good control. Resuming home meds.    3) Edema- particularly in abdomen, but also in legs; reported history of CHF. I've ordered abdominal ultrasound and transthoracic echo. Breathing is stable, CXR was normal.      4) Chronic Kidney Disease, stage 3  - f/u abdominal ultrasound. - says repeat ultrasound in a few months due to renal cysts. Gallbladder distended but no issues     5) FEN / GI- regular diet; low-dose maintenance IV fluids.    6) DVT prophylaxis- subQ heparin.    7) Code Status- FULL CODE. On coumadin  - monitor INR, pharm dosing       Patient needs to be treated atleast 2 more days until repeat cultures are negative and leg pain resolves.  Do not know why patient is on coumadin but continued      Signed By: Lexi Beach MD     August 21, 2017

## 2017-08-21 NOTE — PROGRESS NOTES
END OF SHIFT NOTE:    INTAKE/OUTPUT  08/20 0701 - 08/21 0700  In: 2008 [P.O.:350; I.V.:1658]  Out: 201 [Urine:201]  Voiding: YES  Catheter: NO  Drain:              Flatus: Patient does have flatus present. Stool:  0 occurrences. Characteristics:       Emesis: 0 occurrences. Characteristics:        VITAL SIGNS  Patient Vitals for the past 12 hrs:   Temp Pulse Resp BP SpO2   08/21/17 0400 98 °F (36.7 °C) 95 20 130/77 93 %   08/21/17 0000 98 °F (36.7 °C) 89 20 126/74 93 %   08/20/17 2000 98.7 °F (37.1 °C) 89 20 128/65 94 %       Pain Assessment  Pain Intensity 1: 7 (08/21/17 0030)  Pain Location 1: Leg  Pain Intervention(s) 1: Medication (see MAR)  Patient Stated Pain Goal: 0    Ambulating  Yes    Shift report will be given to oncoming nurse at the bedside.     Chavez Monique RN

## 2017-08-21 NOTE — PROGRESS NOTES
Problem: Falls - Risk of  Goal: *Absence of Falls  Document Krishna Fall Risk and appropriate interventions in the flowsheet.    Outcome: Progressing Towards Goal  Fall Risk Interventions:              Medication Interventions: Evaluate medications/consider consulting pharmacy, Patient to call before getting OOB, Teach patient to arise slowly     Elimination Interventions: Call light in reach, Patient to call for help with toileting needs, Urinal in reach

## 2017-08-21 NOTE — CDMP QUERY
Please clarify if this patient is being treated/managed for:    SEPSIS in the setting of cellulitis with WBC 21.6, 95% Neutrophils, + blood cultures and  being treated with IVF and IV antibiotics    =>Other Explanation of clinical findings  =>Unable to Determine (no explanation of clinical findings)    The medical record reflects the following:    Risk Factors: Cellulitis    Clinical Indicators: , WBC 21.6 with 95% Neutrophils, + blood culture    Treatment: IVF and IV antibiotics    Please clarify and document your clinical opinion in the progress notes and discharge summary including the definitive and/or presumptive diagnosis, (suspected or probable), related to the above clinical findings. Please include clinical findings supporting your diagnosis.     Thanks,  Sam Mims RN, CDS  Compliant Documentation Management Program  [phone]

## 2017-08-21 NOTE — PROGRESS NOTES
Warfarin dosing per pharmacist    Reymundo Chadwick is a 80 y.o. male. Height: 6' 5\" (195.6 cm)    Weight: 120.2 kg (265 lb)    Indication:  Chronic a fib and history of LE DVT    Goal INR:  2-3    Home dose:  Last notes in 2016: 10 mg on MWF and 7.5 mg S,T,Th,Sa    Risk factors or significant drug interactions:  --    Other anticoagulants:  Heparin sub q     Daily Monitoring  Date  INR     Warfarin dose HGB              Notes  8/20  1.6  7.5 mg  ---  8/21  1.5  7.5 mg  11.6    Pharmacy consulted to dose home medication. Unsure indication, but previous outpatient encounters discusses chronic a fib with history of DVT. Pt picks up 5 mg tablets and last documented home dose is 10 mg M, W, F and 7.5 mg all other days. Will give 7.5 mg again tonight and may need to increase if patient has been compliant and sub therapeutic. Would likely give a 10 mg dose tomorrow if no increase tomorrow. INR has been ordered daily.       Thank you,  Shwetha Michelle, PharmD  Clinical Pharmacist  [phone]

## 2017-08-22 PROBLEM — A41.9 SEPSIS DUE TO CELLULITIS (HCC): Status: ACTIVE | Noted: 2017-08-22

## 2017-08-22 PROBLEM — L03.115 CELLULITIS OF RIGHT LOWER LEG: Status: ACTIVE | Noted: 2017-08-20

## 2017-08-22 PROBLEM — I10 HYPERTENSION: Status: ACTIVE | Noted: 2017-08-22

## 2017-08-22 PROBLEM — I48.20 CHRONIC ATRIAL FIBRILLATION (HCC): Status: ACTIVE | Noted: 2017-08-22

## 2017-08-22 PROBLEM — L03.90 SEPSIS DUE TO CELLULITIS (HCC): Status: ACTIVE | Noted: 2017-08-22

## 2017-08-22 PROBLEM — K42.9 UMBILICAL HERNIA: Status: ACTIVE | Noted: 2017-08-22

## 2017-08-22 LAB
ANION GAP SERPL CALC-SCNC: 8 MMOL/L (ref 7–16)
BACTERIA SPEC CULT: ABNORMAL
BASOPHILS # BLD: 0 K/UL (ref 0–0.2)
BASOPHILS NFR BLD: 0 % (ref 0–2)
BUN SERPL-MCNC: 18 MG/DL (ref 8–23)
CALCIUM SERPL-MCNC: 8.8 MG/DL (ref 8.3–10.4)
CHLORIDE SERPL-SCNC: 104 MMOL/L (ref 98–107)
CO2 SERPL-SCNC: 26 MMOL/L (ref 21–32)
CREAT SERPL-MCNC: 0.89 MG/DL (ref 0.8–1.5)
DIFFERENTIAL METHOD BLD: ABNORMAL
EOSINOPHIL # BLD: 0 K/UL (ref 0–0.8)
EOSINOPHIL NFR BLD: 0 % (ref 0.5–7.8)
ERYTHROCYTE [DISTWIDTH] IN BLOOD BY AUTOMATED COUNT: 16.4 % (ref 11.9–14.6)
GLUCOSE SERPL-MCNC: 109 MG/DL (ref 65–100)
GRAM STN SPEC: ABNORMAL
HCT VFR BLD AUTO: 32.7 % (ref 41.1–50.3)
HGB BLD-MCNC: 10.5 G/DL (ref 13.6–17.2)
IMM GRANULOCYTES # BLD: 0 K/UL (ref 0–0.5)
IMM GRANULOCYTES NFR BLD: 0.3 % (ref 0–5)
INR PPP: 1.7 (ref 0.9–1.2)
LYMPHOCYTES # BLD: 0.4 K/UL (ref 0.5–4.6)
LYMPHOCYTES NFR BLD: 3 % (ref 13–44)
MCH RBC QN AUTO: 26.2 PG (ref 26.1–32.9)
MCHC RBC AUTO-ENTMCNC: 32.1 G/DL (ref 31.4–35)
MCV RBC AUTO: 81.5 FL (ref 79.6–97.8)
MONOCYTES # BLD: 1.4 K/UL (ref 0.1–1.3)
MONOCYTES NFR BLD: 10 % (ref 4–12)
NEUTS SEG # BLD: 12.1 K/UL (ref 1.7–8.2)
NEUTS SEG NFR BLD: 87 % (ref 43–78)
PLATELET # BLD AUTO: 127 K/UL (ref 150–450)
PMV BLD AUTO: 11.1 FL (ref 10.8–14.1)
POTASSIUM SERPL-SCNC: 3 MMOL/L (ref 3.5–5.1)
PROTHROMBIN TIME: 19 SEC (ref 9.6–12)
RBC # BLD AUTO: 4.01 M/UL (ref 4.23–5.67)
SERVICE CMNT-IMP: ABNORMAL
SODIUM SERPL-SCNC: 138 MMOL/L (ref 136–145)
VANCOMYCIN TROUGH SERPL-MCNC: 8.1 UG/ML (ref 5–20)
WBC # BLD AUTO: 13.9 K/UL (ref 4.3–11.1)

## 2017-08-22 PROCEDURE — 74011250636 HC RX REV CODE- 250/636: Performed by: INTERNAL MEDICINE

## 2017-08-22 PROCEDURE — 74011250637 HC RX REV CODE- 250/637: Performed by: HOSPITALIST

## 2017-08-22 PROCEDURE — 80048 BASIC METABOLIC PNL TOTAL CA: CPT | Performed by: INTERNAL MEDICINE

## 2017-08-22 PROCEDURE — 85610 PROTHROMBIN TIME: CPT | Performed by: INTERNAL MEDICINE

## 2017-08-22 PROCEDURE — 65270000029 HC RM PRIVATE

## 2017-08-22 PROCEDURE — 74011250636 HC RX REV CODE- 250/636: Performed by: NURSE PRACTITIONER

## 2017-08-22 PROCEDURE — 74011000258 HC RX REV CODE- 258: Performed by: HOSPITALIST

## 2017-08-22 PROCEDURE — 74011250636 HC RX REV CODE- 250/636: Performed by: HOSPITALIST

## 2017-08-22 PROCEDURE — 36415 COLL VENOUS BLD VENIPUNCTURE: CPT | Performed by: INTERNAL MEDICINE

## 2017-08-22 PROCEDURE — 80202 ASSAY OF VANCOMYCIN: CPT | Performed by: INTERNAL MEDICINE

## 2017-08-22 PROCEDURE — 74011250637 HC RX REV CODE- 250/637: Performed by: INTERNAL MEDICINE

## 2017-08-22 PROCEDURE — 85025 COMPLETE CBC W/AUTO DIFF WBC: CPT | Performed by: INTERNAL MEDICINE

## 2017-08-22 RX ORDER — CEFAZOLIN SODIUM IN 0.9 % NACL 2 G/50 ML
2 INTRAVENOUS SOLUTION, PIGGYBACK (ML) INTRAVENOUS EVERY 8 HOURS
Status: DISCONTINUED | OUTPATIENT
Start: 2017-08-22 | End: 2017-08-25 | Stop reason: HOSPADM

## 2017-08-22 RX ORDER — POTASSIUM CHLORIDE 20 MEQ/1
40 TABLET, EXTENDED RELEASE ORAL
Status: COMPLETED | OUTPATIENT
Start: 2017-08-22 | End: 2017-08-22

## 2017-08-22 RX ORDER — HYDROMORPHONE HYDROCHLORIDE 1 MG/ML
1 INJECTION, SOLUTION INTRAMUSCULAR; INTRAVENOUS; SUBCUTANEOUS
Status: DISCONTINUED | OUTPATIENT
Start: 2017-08-22 | End: 2017-08-25 | Stop reason: HOSPADM

## 2017-08-22 RX ORDER — VANCOMYCIN HYDROCHLORIDE
1250 EVERY 12 HOURS
Status: DISCONTINUED | OUTPATIENT
Start: 2017-08-22 | End: 2017-08-22

## 2017-08-22 RX ADMIN — HYDROMORPHONE HYDROCHLORIDE 1 MG: 1 INJECTION, SOLUTION INTRAMUSCULAR; INTRAVENOUS; SUBCUTANEOUS at 18:10

## 2017-08-22 RX ADMIN — WARFARIN SODIUM 7.5 MG: 5 TABLET ORAL at 21:54

## 2017-08-22 RX ADMIN — METOPROLOL TARTRATE 25 MG: 25 TABLET, FILM COATED ORAL at 08:09

## 2017-08-22 RX ADMIN — LACTOBACILLUS TAB 1 TABLET: TAB at 08:09

## 2017-08-22 RX ADMIN — LISINOPRIL 10 MG: 5 TABLET ORAL at 08:08

## 2017-08-22 RX ADMIN — LISINOPRIL 10 MG: 5 TABLET ORAL at 16:11

## 2017-08-22 RX ADMIN — HYDROMORPHONE HYDROCHLORIDE 1 MG: 1 INJECTION, SOLUTION INTRAMUSCULAR; INTRAVENOUS; SUBCUTANEOUS at 23:55

## 2017-08-22 RX ADMIN — VANCOMYCIN HYDROCHLORIDE 2000 MG: 10 INJECTION, POWDER, LYOPHILIZED, FOR SOLUTION INTRAVENOUS at 01:06

## 2017-08-22 RX ADMIN — LORAZEPAM 1 MG: 1 TABLET ORAL at 16:21

## 2017-08-22 RX ADMIN — Medication 5 ML: at 21:13

## 2017-08-22 RX ADMIN — AMLODIPINE BESYLATE 10 MG: 10 TABLET ORAL at 08:09

## 2017-08-22 RX ADMIN — CEFAZOLIN SODIUM 1 G: 1 INJECTION, POWDER, FOR SOLUTION INTRAMUSCULAR; INTRAVENOUS at 06:45

## 2017-08-22 RX ADMIN — HYDROCORTISONE: 5 CREAM TOPICAL at 08:10

## 2017-08-22 RX ADMIN — CEFAZOLIN SODIUM 1 G: 1 INJECTION, POWDER, FOR SOLUTION INTRAMUSCULAR; INTRAVENOUS at 13:46

## 2017-08-22 RX ADMIN — ZOLPIDEM TARTRATE 5 MG: 5 TABLET ORAL at 21:58

## 2017-08-22 RX ADMIN — POTASSIUM CHLORIDE 40 MEQ: 20 TABLET, EXTENDED RELEASE ORAL at 09:16

## 2017-08-22 RX ADMIN — Medication 10 ML: at 06:45

## 2017-08-22 RX ADMIN — HYDROMORPHONE HYDROCHLORIDE 0.5 MG: 1 INJECTION, SOLUTION INTRAMUSCULAR; INTRAVENOUS; SUBCUTANEOUS at 13:52

## 2017-08-22 RX ADMIN — Medication 10 ML: at 11:34

## 2017-08-22 RX ADMIN — METOPROLOL TARTRATE 25 MG: 25 TABLET, FILM COATED ORAL at 16:11

## 2017-08-22 RX ADMIN — LACTOBACILLUS TAB 1 TABLET: TAB at 16:11

## 2017-08-22 RX ADMIN — CEFAZOLIN 2 G: 1 INJECTION, POWDER, FOR SOLUTION INTRAMUSCULAR; INTRAVENOUS; PARENTERAL at 20:54

## 2017-08-22 RX ADMIN — PANTOPRAZOLE SODIUM 40 MG: 40 TABLET, DELAYED RELEASE ORAL at 08:08

## 2017-08-22 RX ADMIN — HYDROCORTISONE: 5 CREAM TOPICAL at 16:13

## 2017-08-22 NOTE — PROGRESS NOTES
END OF SHIFT NOTE:    INTAKE/OUTPUT  08/21 0701 - 08/22 0700  In: 26 [P.O.:240; I.V.:618]  Out: 250 [Urine:250]  Voiding: YES  Catheter: NO  Drain:              Flatus: Patient does have flatus present. Stool:  1 occurrences. Characteristics:       Emesis: 0 occurrences. Characteristics:        VITAL SIGNS  Patient Vitals for the past 12 hrs:   Temp Pulse Resp BP SpO2   08/22/17 0300 98 °F (36.7 °C) 71 18 124/68 91 %   08/21/17 2300 98.2 °F (36.8 °C) 82 17 121/62 90 %   08/21/17 1900 98.2 °F (36.8 °C) 76 16 136/74 91 %       Pain Assessment  Pain Intensity 1: 10 (08/21/17 7548)  Pain Location 1: Leg  Pain Intervention(s) 1: Medication (see MAR)  Patient Stated Pain Goal: 5    Ambulating  Yes    Shift report will be given to oncoming nurse at the bedside.     Leah Ag RN

## 2017-08-22 NOTE — PROGRESS NOTES
Hospitalist Progress Note    2017  Admit Date: 2017 10:53 PM   NAME: Armond Tarango   :  1934   MRN:  [id number]   Attending: Bettina Perry MD  PCP:  PROVIDER UNKNOWN    SUBJECTIVE:   80 M with HTN, Ch A Fib on Coumadin admitted with Cellulitis of Rt leg with GM +ve Bacteremia, seen by ID. Feels better, has pain in Rt leg, better with meds. Nursing notes and chart reviewed. Review of Systems negative with exception of pertinent positives noted above. PHYSICAL EXAM     Visit Vitals    /80    Pulse 74    Temp 98 °F (36.7 °C)    Resp 18    Ht 6' 5\" (1.956 m)    Wt 120.2 kg (265 lb)    SpO2 93%    BMI 31.42 kg/m2      Temp (24hrs), Av.1 °F (36.7 °C), Min:97.9 °F (36.6 °C), Max:98.2 °F (36.8 °C)    Oxygen Therapy  O2 Sat (%): 93 % (17 1526)  Pulse via Oximetry: 102 beats per minute (17 0130)  O2 Device: Room air (17 1213)    Intake/Output Summary (Last 24 hours) at 17 1559  Last data filed at 17 1355   Gross per 24 hour   Intake              592 ml   Output              500 ml   Net               92 ml       General: No acute distress. Alert.    Lungs:  CTABL. Heart:  RRR, no murmur, rub, or gallop  Abdomen: Soft, non-distended, non-tender, +bs, Umbilical hernia  Extremities: No cyanosis or clubbing. Rt leh swollen, warm and tender  Skin:  Erythematous rash on Rt Leg  Neurologic:  No focal deficits. Moves all extremities. Psych:  Anxious    LABS AND STUDIES:  Personally reviewed all labs, meds, and studies for past 24hrs.       ASSESSMENT      Active Hospital Problems    Diagnosis Date Noted    Umbilical hernia     Hypertension 2017    Chronic atrial fibrillation ([facility] [address] 2017     On Coumadin      Sepsis due to cellulitis ([facility] [address] 2017     Rt leg Cellulitis      Cellulitis of right lower leg 2017     Due to Gram Positive Bacteria      Bacteremia 2017           PLAN:  · C/w IV Abx, consult ID for Strep Dysgalactiae bacteremia  · -ve US leg for DVTs  · On coumadin for Ch A Fib  · C/w BP meds  · Will consul;t surgery if Umbilical hernia worsens. Dispo: pending improvement    DVT ppx:  on coumadin  Discussed plan with pt who is in agreement. All questions answered.     Signed By: Inez Valadez MD     August 22, 2017

## 2017-08-22 NOTE — PROGRESS NOTES
Warfarin dosing per pharmacist    Sulma Jose is a 80 y.o. male. Height: 6' 5\" (195.6 cm)    Weight: 120.2 kg (265 lb)    Indication:  Chronic a fib and history of LE DVT    Goal INR:  2-3    Home dose:  Last notes in 2016: 10 mg on MWF and 7.5 mg S,T,Th,Sa    Risk factors or significant drug interactions:  --    Other anticoagulants:  none    Daily Monitoring  Date  INR     Warfarin dose HGB              Notes  8/20  1.6  7.5 mg  ---  8/21  1.5  7.5 mg  11.6   8/22  1.7  7.5 mg  10.5    Pharmacy consulted to dose home medication. Pt picks up 5 mg tablets and last documented home dose is 10 mg M, W, F and 7.5 mg all other days. INR has trended up since yesterday. Will give 7.5 mg again tonight and may need to increase if patient has been compliant and sub therapeutic. Would likely give a 10 mg dose tomorrow if no increase tomorrow. INR has been ordered daily. Thank you,  Anya Perez.  Theresa Reece, PharmD  Clinical Pharmacist  [phone]
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
## 2017-08-22 NOTE — PROGRESS NOTES
Pharmacokinetic Consult to Pharmacist    Diannasarah Herrera is a 80 y.o. male being treated with vancomycin. Height: 6' 5\" (195.6 cm)  Weight: 120.2 kg (265 lb)  Lab Results   Component Value Date/Time    BUN 18 08/22/2017 06:53 AM    Creatinine 0.89 08/22/2017 06:53 AM    WBC 13.9 08/22/2017 06:53 AM    Procalcitonin 5.6 08/19/2017 11:00 PM    Lactic Acid (POC) 1.8 08/19/2017 11:11 PM      Estimated Creatinine Clearance: 90.3 mL/min (based on Cr of 0.89). All Micro Results     Procedure Component Value Units Date/Time    CULTURE, BLOOD [[id number]] Collected:  08/21/17 0710    Order Status:  Completed Specimen:  Blood from Blood Updated:  08/22/17 0811     Special Requests: LEFT ARM        Culture result: NO GROWTH 1 DAY       CULTURE, BLOOD [[id number]]  (Abnormal) Collected:  08/19/17 2300    Order Status:  Completed Specimen:  Blood from Blood Updated:  08/22/17 0914     Special Requests: RIGHT ANTECUBITAL        GRAM STAIN         GRAM POS COCCI IN CLUSTERS      AEROBIC BOTTLE POSITIVE                 RESULTS VERIFIED, PHONED TO AND READ BACK BY TRICIA VÁZQUEZ RN AT 4791 ON 8/21/17 SG     Culture result:         STAPHYLOCOCCUS SPECIES IDENTIFICATION AND SUSCEPTIBILITY TO FOLLOW (A)              SA target DNA sequence not detected within the sample. Test performed by PCR    CULTURE, BLOOD [[id number]]  (Abnormal)  (Susceptibility) Collected:  08/19/17 2140    Order Status:  Completed Specimen:  Blood from Blood Updated:  08/22/17 0709     Special Requests: LEFT ANTECUBITAL        GRAM STAIN GRAM POS COCCI IN CHAINS         ANAEROBIC BOTTLE POSITIVE                 RESULTS VERIFIED, PHONED TO AND READ BACK BY Lavell Babcock @4557 ON 8/20/17 AK. Culture result:         STREPTOCOCCUS DYSGALACTIAE CANIS (A)            Day 3 of vancomycin. Goal trough is 15-20. SrCr continues to trend down. UOP not well documented. Trough this morning slightly below the target range.  Dose will be adjusted to 1250mg q12h.    Thank you for the consult. We will continue to follow. Susan Villafuerte.  Makenna Noland, PharmD  Clinical Pharmacist  [phone]

## 2017-08-23 ENCOUNTER — APPOINTMENT (OUTPATIENT)
Dept: CT IMAGING | Age: 82
DRG: 872 | End: 2017-08-23
Attending: INTERNAL MEDICINE
Payer: MEDICARE

## 2017-08-23 LAB
ALBUMIN SERPL-MCNC: 2.2 G/DL (ref 3.2–4.6)
ALBUMIN/GLOB SERPL: 0.5 {RATIO} (ref 1.2–3.5)
ALP SERPL-CCNC: 123 U/L (ref 50–136)
ALT SERPL-CCNC: 15 U/L (ref 12–65)
ANION GAP SERPL CALC-SCNC: 7 MMOL/L (ref 7–16)
AST SERPL-CCNC: 21 U/L (ref 15–37)
BACTERIA SPEC CULT: ABNORMAL
BACTERIA SPEC CULT: ABNORMAL
BASOPHILS # BLD: 0 K/UL (ref 0–0.2)
BASOPHILS NFR BLD: 0 % (ref 0–2)
BILIRUB DIRECT SERPL-MCNC: 0.7 MG/DL
BILIRUB SERPL-MCNC: 1.5 MG/DL (ref 0.2–1.1)
BUN SERPL-MCNC: 15 MG/DL (ref 8–23)
CALCIUM SERPL-MCNC: 8.8 MG/DL (ref 8.3–10.4)
CHLORIDE SERPL-SCNC: 105 MMOL/L (ref 98–107)
CO2 SERPL-SCNC: 27 MMOL/L (ref 21–32)
CREAT SERPL-MCNC: 0.9 MG/DL (ref 0.8–1.5)
DIFFERENTIAL METHOD BLD: ABNORMAL
EOSINOPHIL # BLD: 0 K/UL (ref 0–0.8)
EOSINOPHIL NFR BLD: 0 % (ref 0.5–7.8)
ERYTHROCYTE [DISTWIDTH] IN BLOOD BY AUTOMATED COUNT: 16.2 % (ref 11.9–14.6)
GLOBULIN SER CALC-MCNC: 4.1 G/DL (ref 2.3–3.5)
GLUCOSE SERPL-MCNC: 122 MG/DL (ref 65–100)
GRAM STN SPEC: ABNORMAL
HCT VFR BLD AUTO: 32.3 % (ref 41.1–50.3)
HGB BLD-MCNC: 10.4 G/DL (ref 13.6–17.2)
IMM GRANULOCYTES # BLD: 0.1 K/UL (ref 0–0.5)
IMM GRANULOCYTES NFR BLD: 0.4 % (ref 0–5)
INR PPP: 2 (ref 0.9–1.2)
LYMPHOCYTES # BLD: 0.7 K/UL (ref 0.5–4.6)
LYMPHOCYTES NFR BLD: 5 % (ref 13–44)
MCH RBC QN AUTO: 26.3 PG (ref 26.1–32.9)
MCHC RBC AUTO-ENTMCNC: 32.2 G/DL (ref 31.4–35)
MCV RBC AUTO: 81.8 FL (ref 79.6–97.8)
MONOCYTES # BLD: 0.9 K/UL (ref 0.1–1.3)
MONOCYTES NFR BLD: 6 % (ref 4–12)
NEUTS SEG # BLD: 12.9 K/UL (ref 1.7–8.2)
NEUTS SEG NFR BLD: 89 % (ref 43–78)
PLATELET # BLD AUTO: 131 K/UL (ref 150–450)
PMV BLD AUTO: 10.3 FL (ref 10.8–14.1)
POTASSIUM SERPL-SCNC: 3 MMOL/L (ref 3.5–5.1)
PROT SERPL-MCNC: 6.3 G/DL (ref 6.3–8.2)
PROTHROMBIN TIME: 22.1 SEC (ref 9.6–12)
RBC # BLD AUTO: 3.95 M/UL (ref 4.23–5.67)
SERVICE CMNT-IMP: ABNORMAL
SODIUM SERPL-SCNC: 139 MMOL/L (ref 136–145)
WBC # BLD AUTO: 14.5 K/UL (ref 4.3–11.1)

## 2017-08-23 PROCEDURE — 85610 PROTHROMBIN TIME: CPT | Performed by: INTERNAL MEDICINE

## 2017-08-23 PROCEDURE — 74011250637 HC RX REV CODE- 250/637: Performed by: HOSPITALIST

## 2017-08-23 PROCEDURE — 36415 COLL VENOUS BLD VENIPUNCTURE: CPT | Performed by: INTERNAL MEDICINE

## 2017-08-23 PROCEDURE — 74178 CT ABD&PLV WO CNTR FLWD CNTR: CPT

## 2017-08-23 PROCEDURE — 74011250637 HC RX REV CODE- 250/637: Performed by: INTERNAL MEDICINE

## 2017-08-23 PROCEDURE — 85025 COMPLETE CBC W/AUTO DIFF WBC: CPT | Performed by: INTERNAL MEDICINE

## 2017-08-23 PROCEDURE — 74011250636 HC RX REV CODE- 250/636: Performed by: INTERNAL MEDICINE

## 2017-08-23 PROCEDURE — 65270000029 HC RM PRIVATE

## 2017-08-23 PROCEDURE — 74011636320 HC RX REV CODE- 636/320: Performed by: INTERNAL MEDICINE

## 2017-08-23 PROCEDURE — 51798 US URINE CAPACITY MEASURE: CPT

## 2017-08-23 PROCEDURE — 74011000250 HC RX REV CODE- 250: Performed by: INTERNAL MEDICINE

## 2017-08-23 PROCEDURE — 74011000258 HC RX REV CODE- 258: Performed by: INTERNAL MEDICINE

## 2017-08-23 PROCEDURE — 80048 BASIC METABOLIC PNL TOTAL CA: CPT | Performed by: INTERNAL MEDICINE

## 2017-08-23 PROCEDURE — 94760 N-INVAS EAR/PLS OXIMETRY 1: CPT

## 2017-08-23 PROCEDURE — 94640 AIRWAY INHALATION TREATMENT: CPT

## 2017-08-23 PROCEDURE — 80076 HEPATIC FUNCTION PANEL: CPT | Performed by: INTERNAL MEDICINE

## 2017-08-23 PROCEDURE — 74011250636 HC RX REV CODE- 250/636: Performed by: NURSE PRACTITIONER

## 2017-08-23 RX ORDER — GABAPENTIN 100 MG/1
100 CAPSULE ORAL
COMMUNITY

## 2017-08-23 RX ORDER — SODIUM CHLORIDE 0.9 % (FLUSH) 0.9 %
10 SYRINGE (ML) INJECTION
Status: COMPLETED | OUTPATIENT
Start: 2017-08-23 | End: 2017-08-23

## 2017-08-23 RX ORDER — FLUTICASONE PROPIONATE 50 MCG
2 SPRAY, SUSPENSION (ML) NASAL DAILY
Status: DISCONTINUED | OUTPATIENT
Start: 2017-08-23 | End: 2017-08-25 | Stop reason: HOSPADM

## 2017-08-23 RX ORDER — FUROSEMIDE 20 MG/1
10 TABLET ORAL ONCE
Status: COMPLETED | OUTPATIENT
Start: 2017-08-23 | End: 2017-08-23

## 2017-08-23 RX ORDER — WARFARIN 7.5 MG/1
7.5 TABLET ORAL
COMMUNITY

## 2017-08-23 RX ORDER — ALBUTEROL SULFATE 0.83 MG/ML
2.5 SOLUTION RESPIRATORY (INHALATION)
Status: DISCONTINUED | OUTPATIENT
Start: 2017-08-23 | End: 2017-08-25 | Stop reason: HOSPADM

## 2017-08-23 RX ORDER — FUROSEMIDE 20 MG/1
20 TABLET ORAL 2 TIMES DAILY
COMMUNITY

## 2017-08-23 RX ADMIN — CEFAZOLIN 2 G: 1 INJECTION, POWDER, FOR SOLUTION INTRAMUSCULAR; INTRAVENOUS; PARENTERAL at 13:02

## 2017-08-23 RX ADMIN — CEFAZOLIN 2 G: 1 INJECTION, POWDER, FOR SOLUTION INTRAMUSCULAR; INTRAVENOUS; PARENTERAL at 05:27

## 2017-08-23 RX ADMIN — ALBUTEROL SULFATE 2.5 MG: 2.5 SOLUTION RESPIRATORY (INHALATION) at 15:14

## 2017-08-23 RX ADMIN — PANTOPRAZOLE SODIUM 40 MG: 40 TABLET, DELAYED RELEASE ORAL at 08:16

## 2017-08-23 RX ADMIN — FLUTICASONE PROPIONATE 2 SPRAY: 50 SPRAY, METERED NASAL at 10:49

## 2017-08-23 RX ADMIN — METOPROLOL TARTRATE 25 MG: 25 TABLET, FILM COATED ORAL at 17:37

## 2017-08-23 RX ADMIN — HYDROMORPHONE HYDROCHLORIDE 1 MG: 1 INJECTION, SOLUTION INTRAMUSCULAR; INTRAVENOUS; SUBCUTANEOUS at 19:09

## 2017-08-23 RX ADMIN — ZOLPIDEM TARTRATE 5 MG: 5 TABLET ORAL at 22:20

## 2017-08-23 RX ADMIN — WARFARIN SODIUM 7.5 MG: 5 TABLET ORAL at 22:19

## 2017-08-23 RX ADMIN — HYDROCORTISONE: 5 CREAM TOPICAL at 18:00

## 2017-08-23 RX ADMIN — LISINOPRIL 10 MG: 5 TABLET ORAL at 17:37

## 2017-08-23 RX ADMIN — Medication 10 ML: at 13:04

## 2017-08-23 RX ADMIN — METOPROLOL TARTRATE 25 MG: 25 TABLET, FILM COATED ORAL at 08:16

## 2017-08-23 RX ADMIN — AMLODIPINE BESYLATE 10 MG: 10 TABLET ORAL at 08:16

## 2017-08-23 RX ADMIN — HYDROCORTISONE: 5 CREAM TOPICAL at 09:00

## 2017-08-23 RX ADMIN — LISINOPRIL 10 MG: 5 TABLET ORAL at 08:16

## 2017-08-23 RX ADMIN — SODIUM CHLORIDE 100 ML: 900 INJECTION, SOLUTION INTRAVENOUS at 17:17

## 2017-08-23 RX ADMIN — CEFAZOLIN 2 G: 1 INJECTION, POWDER, FOR SOLUTION INTRAMUSCULAR; INTRAVENOUS; PARENTERAL at 22:16

## 2017-08-23 RX ADMIN — ALBUTEROL SULFATE 2.5 MG: 2.5 SOLUTION RESPIRATORY (INHALATION) at 19:47

## 2017-08-23 RX ADMIN — IOPAMIDOL 100 ML: 755 INJECTION, SOLUTION INTRAVENOUS at 17:17

## 2017-08-23 RX ADMIN — LACTOBACILLUS TAB 1 TABLET: TAB at 17:37

## 2017-08-23 RX ADMIN — Medication 5 ML: at 22:16

## 2017-08-23 RX ADMIN — FUROSEMIDE 10 MG: 20 TABLET ORAL at 03:55

## 2017-08-23 RX ADMIN — Medication 5 ML: at 05:28

## 2017-08-23 RX ADMIN — LACTOBACILLUS TAB 1 TABLET: TAB at 08:16

## 2017-08-23 RX ADMIN — Medication 10 ML: at 17:17

## 2017-08-23 RX ADMIN — HYDROCODONE BITARTRATE AND ACETAMINOPHEN 1 TABLET: 5; 325 TABLET ORAL at 17:37

## 2017-08-23 NOTE — PROGRESS NOTES
Problem: Falls - Risk of  Goal: *Absence of Falls  Document Krishna Fall Risk and appropriate interventions in the flowsheet.    Outcome: Progressing Towards Goal  Fall Risk Interventions:  Mobility Interventions: Communicate number of staff needed for ambulation/transfer, OT consult for ADLs, Patient to call before getting OOB, PT Consult for mobility concerns, Strengthening exercises (ROM-active/passive)           Medication Interventions: Assess postural VS orthostatic hypotension, Evaluate medications/consider consulting pharmacy, Patient to call before getting OOB, Teach patient to arise slowly     Elimination Interventions: Call light in reach, Patient to call for help with toileting needs, Toileting schedule/hourly rounds, Urinal in reach

## 2017-08-23 NOTE — PROGRESS NOTES
Infectious Disease Progress Note    Today's Date: 2017   Admit Date: 2017    Impression:   · Grp G strep bacteremia (strep dysgalactiae) and CONS bacteremia (). Follow up [facility]  NTD, pending, TTE negative  · RLE cellulitis  · CHF with chronic LE edema  · CKD3  · Elevated total bilirubin     Plan:   · Continue Cefazolin while he is in-patient, monitor WBC, cellulitis  · He will be able to transition to Levaquin 750mg PO daily at discharge  · Nursing to assist with RLE elevation  · Check PCT in am    Anti-infectives:   Vanc (-)  Cefazolin (-  Zosyn (-)      Subjective:   Resting in bed, feeling somewhat better. R leg sore. Allergies   Allergen Reactions    Demerol [Meperidine] Nausea and Vomiting        Review of Systems:  A comprehensive review of systems was negative except for that written in the History of Present Illness. Objective:     Visit Vitals    BP (!) 150/91    Pulse 75    Temp 98 °F (36.7 °C)    Resp 18    Ht 6' 5\" (1.956 m)    Wt 120.2 kg (265 lb)    SpO2 96%    BMI 31.42 kg/m2     Temp (24hrs), Av.1 °F (36.7 °C), Min:98 °F (36.7 °C), Max:98.2 °F (36.8 °C)       Lines:  Peripheral IV:  LUE intact          Physical Exam:    General:  Alert, cooperative, well noursished, well developed, appears stated age, obese, hard of heariong   Eyes:  Sclera anicteric. Pupils equally round and reactive to light. Mouth/Throat: Mucous membranes normal, oral pharynx clear, dentition poor   Neck: Supple   Lungs:   Clear to auscultation bilaterally, good effort   CV:  Regular rate and rhythm,no murmur, click, rub or gallop   Abdomen:   Soft, non-tender. bowel sounds normal. non-distended   Extremities: Manohar LE edema, R>L   Skin: Skin color, texture, turgor normal. no acute rash or lesions.  RLE cellulitis (receeding margins), moderate warmth and erythema, no drainage   Lymph nodes: Cervical and supraclavicular normal   Musculoskeletal: No swelling or deformity Lines/Devices:  Intact, no erythema, drainage or tenderness   Psych: Alert and oriented, normal mood affect given the setting         Data Review:     CBC:  Recent Labs      08/23/17   0616  08/22/17   0653  08/21/17   0710   WBC  14.5*  13.9*  18.4*   GRANS  89*  87*  90*   MONOS  6  10  5   EOS  0*  0*  0*   ANEU  12.9*  12.1*  16.5*   ABL  0.7  0.4*  0.9   HGB  10.4*  10.5*  11.6*   HCT  32.3*  32.7*  36.6*   PLT  131*  127*  134*       BMP:  Recent Labs      08/23/17   0616  08/22/17   0653  08/21/17   0710   CREA  0.90  0.89  1.04   BUN  15  18  21   NA  139  138  138   K  3.0*  3.0*  3.4*   CL  105  104  104   CO2  27  26  25   AGAP  7  8  9   GLU  122*  109*  95       LFTS:  Recent Labs      08/23/17   0616   TBILI  1.5*   ALT  15   SGOT  21   AP  123   TP  6.3   ALB  2.2*       Microbiology:     All Micro Results     Procedure Component Value Units Date/Time    CULTURE, BLOOD [[id number]] Collected:  08/21/17 0710    Order Status:  Completed Specimen:  Blood from Blood Updated:  08/23/17 0651     Special Requests: LEFT ARM        Culture result: NO GROWTH 2 DAYS       CULTURE, BLOOD [[id number]]  (Abnormal)  (Susceptibility) Collected:  08/19/17 2300    Order Status:  Completed Specimen:  Blood from Blood Updated:  08/23/17 0647     Special Requests: RIGHT ANTECUBITAL        GRAM STAIN         GRAM POS COCCI IN CLUSTERS      AEROBIC BOTTLE POSITIVE                 RESULTS VERIFIED, PHONED TO AND READ BACK BY TRICIA VÁZQUEZ RN AT 2604 ON 8/21/17 SG     Culture result:         STAPHYLOCOCCUS HOMINIS (A)              SA target DNA sequence not detected within the sample.  Test performed by PCR    CULTURE, BLOOD [[id number]]  (Abnormal)  (Susceptibility) Collected:  08/19/17 2140    Order Status:  Completed Specimen:  Blood from Blood Updated:  08/22/17 1437     Special Requests: LEFT ANTECUBITAL        GRAM STAIN GRAM POS COCCI IN CHAINS         ANAEROBIC BOTTLE POSITIVE                 RESULTS VERIFIED, PHONED TO AND READ BACK BY Estrella Huizar [id number] ON 8/20/17 AK.      Culture result:         STREPTOCOCCUS DYSGALACTIAE GROUP C (A)          Imaging:   CXR clear  RLE venous duplex negative  Abd US: renal cysts, no ascites    Signed By: Ghazal Chapa NP     August 23, 2017

## 2017-08-23 NOTE — PROGRESS NOTES
Upon entrance into room to administer medication patient seemed short of breath. HOB elevated , checked oxygen saturation via pulse oximeter and it showed to be 86% on room air with a pulse of 67. Applied O2 NC 2L to bring O2 saturation up to 88%. Increased O2 to 4L. Oxygen saturation is now 95%, lung sounds are clear and diminished. Will continue to observe and titrate oxygen as needed. Will call respiratory to assess if needed.

## 2017-08-23 NOTE — PROGRESS NOTES
END OF SHIFT NOTE:    INTAKE/OUTPUT  08/22 0701 - 08/23 0700  In: 0   Out: 602 [Urine:602]  Voiding: YES  Catheter: NO  Drain:              Flatus: Patient does have flatus present. Stool:  0 occurrences. Characteristics:       Emesis: 0 occurrences. Characteristics:        VITAL SIGNS  Patient Vitals for the past 12 hrs:   Temp Pulse Resp BP SpO2   08/23/17 1518 - - - - 92 %   08/23/17 1500 97.7 °F (36.5 °C) 96 18 126/72 96 %   08/23/17 1133 98 °F (36.7 °C) 75 18 (!) 150/91 96 %   08/23/17 0735 98.2 °F (36.8 °C) 93 18 135/70 96 %       Pain Assessment  Pain Intensity 1: 10 (08/23/17 1738)  Pain Location 1: Leg  Pain Intervention(s) 1: Medication (see MAR)  Patient Stated Pain Goal: 0    Ambulating  Yes    Shift report given to oncoming nurse at the bedside.     Nori Carter RN

## 2017-08-23 NOTE — PROGRESS NOTES
Problem: Pain  Goal: *Control of Pain  Outcome: Progressing Towards Goal  Giving IV medications PRN    Problem: Patient Education: Go to Patient Education Activity  Goal: Patient/Family Education  Outcome: Progressing Towards Goal  Instructed to call when in pain    Problem: Cellulitis Care Plan (Adult)  Goal: *Skin integrity maintained  Outcome: Progressing Towards Goal  Extremity elevated

## 2017-08-23 NOTE — PROGRESS NOTES
Warfarin dosing per pharmacist    Carlos Alberto Marie is a 80 y.o. male. Height: 6' 5\" (195.6 cm)    Weight: 120.2 kg (265 lb)    Indication:  Chronic a fib and history of LE DVT    Goal INR:  2-3    Home dose:  Last notes in 2016: 10 mg on MWF and 7.5 mg S,T,Th,Sa    Risk factors or significant drug interactions:  --    Other anticoagulants:  none    Daily Monitoring  Date  INR     Warfarin dose HGB              Notes  8/20  1.6  7.5 mg  ---  8/21  1.5  7.5 mg  11.6   8/22  1.7  7.5 mg  10.5  8/23  2.0  7.5 mg  10.4    Pharmacy consulted to dose home medication. Pt picks up 5 mg tablets and last documented home dose is 10 mg M, W, F and 7.5 mg all other days. INR has trended up since yesterday. Will continue 7.5 mg again tonight and may need to increase if patient has been compliant and sub therapeutic. Would likely give a 10 mg dose tomorrow if no increase tomorrow. INR has been ordered daily.       Thank you,  Brandy Plummer, PharmD, BCPS  Clinical Pharmacist  [phone]

## 2017-08-23 NOTE — PROGRESS NOTES
Called MD about patient's inadequate urine output throughout shift. Explained that patient reports taking Lasix at home but is not sure what dose. Could not find it on the PTA Med list and he is requesting it. Also reported patient's increasing shortness of breath and desaturation to 86% without applied oxygen. MD ordered 10 mg PO Lasix ONCE and to bladder scan the patient. If >400 ml found in the bladder then insert straight catheter.

## 2017-08-23 NOTE — PROGRESS NOTES
Hospitalist Progress Note    2017  Admit Date: 2017 10:53 PM   NAME: Demarco López   :  1934   MRN:  [id number]   Attending: Aman Gunn MD  PCP:  PROVIDER UNKNOWN    SUBJECTIVE:   80 M with HTN, Ch A Fib on Coumadin admitted with Cellulitis of Rt leg with GM +ve Bacteremia, seen by ID.    : Reports having SOB overnight, afebrile, has pain in Rt leg, better with meds. Nursing notes and chart reviewed. Review of Systems negative with exception of pertinent positives noted above. PHYSICAL EXAM     Visit Vitals    /70    Pulse 93    Temp 98.2 °F (36.8 °C)    Resp 18    Ht 6' 5\" (1.956 m)    Wt 120.2 kg (265 lb)    SpO2 96%    BMI 31.42 kg/m2      Temp (24hrs), Av.1 °F (36.7 °C), Min:98 °F (36.7 °C), Max:98.2 °F (36.8 °C)    Oxygen Therapy  O2 Sat (%): 96 % (17 0735)  Pulse via Oximetry: 67 beats per minute (17 2100)  O2 Device: Nasal cannula (17 0709)  O2 Flow Rate (L/min): 4 l/min (17 0709)    Intake/Output Summary (Last 24 hours) at 17 0909  Last data filed at 17 2300   Gross per 24 hour   Intake                0 ml   Output              402 ml   Net             -402 ml       General: No acute distress. Alert.    Lungs:  CTABL. Heart:  RRR, no murmur, rub, or gallop  Abdomen: Soft, non-distended, non-tender, +bs, Umbilical hernia  Extremities: No cyanosis or clubbing. Rt leh swollen, warm and tender  Skin:  Erythematous rash on Rt Leg  Neurologic:  No focal deficits. Moves all extremities. Psych:  Anxious    LABS AND STUDIES:  Personally reviewed all labs, meds, and studies for past 24hrs.       ASSESSMENT      Active Hospital Problems    Diagnosis Date Noted    Umbilical hernia     Hypertension 2017    Chronic atrial fibrillation ([facility] [address] 2017     On Coumadin      Sepsis due to cellulitis ([address] 2017     Rt leg Cellulitis      Cellulitis of right lower leg 2017     Due to Gram Positive Bacteria      Bacteremia 08/20/2017           PLAN:    · C/w IV Abx per ID for Strep Dysgalactiae bacteremia  · -ve US leg for DVTs  · On coumadin for Ch A Fib  · C/w BP meds  · Will consult surgery if Umbilical hernia worsens. · Add O2, nebs    Dispo: pending improvement    DVT ppx:  on coumadin  Discussed plan with pt who is in agreement. All questions answered.     Signed By: Shannen Malone MD     August 23, 2017

## 2017-08-23 NOTE — PROGRESS NOTES
END OF SHIFT NOTE:    INTAKE/OUTPUT  08/22 0701 - 08/23 0700  In: 0   Out: 602 [Urine:602]  Voiding: YES  Catheter: NO  Drain:              Flatus: Patient does have flatus present. Stool:  1 occurrences. Characteristics:       Emesis: 0 occurrences. Characteristics:        VITAL SIGNS  Patient Vitals for the past 12 hrs:   Temp Pulse Resp BP SpO2   08/23/17 0300 98.1 °F (36.7 °C) 91 20 (!) 152/91 93 %   08/22/17 2300 98 °F (36.7 °C) 83 20 (!) 133/92 90 %   08/22/17 2105 - - - - 95 %   08/22/17 2100 - - - - (!) 86 %   08/22/17 1900 98.1 °F (36.7 °C) (!) 58 19 133/78 90 %       Pain Assessment  Pain Intensity 1: 0 (08/23/17 0305)  Pain Location 1: Leg  Pain Intervention(s) 1: Medication (see MAR)  Patient Stated Pain Goal: 0    Ambulating  Yes    Shift report given to oncoming nurse at the bedside.     Armida Farias, RN

## 2017-08-24 LAB
BASOPHILS # BLD: 0 K/UL (ref 0–0.2)
BASOPHILS NFR BLD: 0 % (ref 0–2)
CRP SERPL-MCNC: 12.5 MG/DL (ref 0–0.9)
DIFFERENTIAL METHOD BLD: ABNORMAL
EOSINOPHIL # BLD: 0.1 K/UL (ref 0–0.8)
EOSINOPHIL NFR BLD: 1 % (ref 0.5–7.8)
ERYTHROCYTE [DISTWIDTH] IN BLOOD BY AUTOMATED COUNT: 16.4 % (ref 11.9–14.6)
HCT VFR BLD AUTO: 32.1 % (ref 41.1–50.3)
HGB BLD-MCNC: 10.7 G/DL (ref 13.6–17.2)
IMM GRANULOCYTES # BLD: 0.1 K/UL (ref 0–0.5)
IMM GRANULOCYTES NFR BLD: 1 % (ref 0–5)
INR PPP: 2.5 (ref 0.9–1.2)
LYMPHOCYTES # BLD: 0.3 K/UL (ref 0.5–4.6)
LYMPHOCYTES NFR BLD: 3 % (ref 13–44)
MCH RBC QN AUTO: 26.5 PG (ref 26.1–32.9)
MCHC RBC AUTO-ENTMCNC: 33.3 G/DL (ref 31.4–35)
MCV RBC AUTO: 79.5 FL (ref 79.6–97.8)
MONOCYTES # BLD: 1 K/UL (ref 0.1–1.3)
MONOCYTES NFR BLD: 10 % (ref 4–12)
NEUTS SEG # BLD: 9.4 K/UL (ref 1.7–8.2)
NEUTS SEG NFR BLD: 85 % (ref 43–78)
PLATELET # BLD AUTO: 162 K/UL (ref 150–450)
PMV BLD AUTO: 10.4 FL (ref 10.8–14.1)
PROCALCITONIN SERPL-MCNC: 0.3 NG/ML
PROTHROMBIN TIME: 27.9 SEC (ref 9.6–12)
RBC # BLD AUTO: 4.04 M/UL (ref 4.23–5.67)
WBC # BLD AUTO: 10.9 K/UL (ref 4.3–11.1)

## 2017-08-24 PROCEDURE — 85610 PROTHROMBIN TIME: CPT | Performed by: INTERNAL MEDICINE

## 2017-08-24 PROCEDURE — 74011250636 HC RX REV CODE- 250/636: Performed by: INTERNAL MEDICINE

## 2017-08-24 PROCEDURE — 74011250637 HC RX REV CODE- 250/637: Performed by: HOSPITALIST

## 2017-08-24 PROCEDURE — 65270000029 HC RM PRIVATE

## 2017-08-24 PROCEDURE — 74011250637 HC RX REV CODE- 250/637: Performed by: INTERNAL MEDICINE

## 2017-08-24 PROCEDURE — 74011000250 HC RX REV CODE- 250: Performed by: INTERNAL MEDICINE

## 2017-08-24 PROCEDURE — 86140 C-REACTIVE PROTEIN: CPT | Performed by: INTERNAL MEDICINE

## 2017-08-24 PROCEDURE — 84145 PROCALCITONIN (PCT): CPT | Performed by: INTERNAL MEDICINE

## 2017-08-24 PROCEDURE — 36415 COLL VENOUS BLD VENIPUNCTURE: CPT | Performed by: INTERNAL MEDICINE

## 2017-08-24 PROCEDURE — 97161 PT EVAL LOW COMPLEX 20 MIN: CPT

## 2017-08-24 PROCEDURE — 85025 COMPLETE CBC W/AUTO DIFF WBC: CPT | Performed by: INTERNAL MEDICINE

## 2017-08-24 PROCEDURE — 74011250636 HC RX REV CODE- 250/636: Performed by: NURSE PRACTITIONER

## 2017-08-24 PROCEDURE — 94760 N-INVAS EAR/PLS OXIMETRY 1: CPT

## 2017-08-24 PROCEDURE — 94640 AIRWAY INHALATION TREATMENT: CPT

## 2017-08-24 RX ORDER — WARFARIN SODIUM 5 MG/1
5 TABLET ORAL
Status: DISCONTINUED | OUTPATIENT
Start: 2017-08-24 | End: 2017-08-25 | Stop reason: HOSPADM

## 2017-08-24 RX ADMIN — WARFARIN SODIUM 5 MG: 5 TABLET ORAL at 22:33

## 2017-08-24 RX ADMIN — ALBUTEROL SULFATE 2.5 MG: 2.5 SOLUTION RESPIRATORY (INHALATION) at 14:40

## 2017-08-24 RX ADMIN — HYDROMORPHONE HYDROCHLORIDE 1 MG: 1 INJECTION, SOLUTION INTRAMUSCULAR; INTRAVENOUS; SUBCUTANEOUS at 23:31

## 2017-08-24 RX ADMIN — HYDROMORPHONE HYDROCHLORIDE 1 MG: 1 INJECTION, SOLUTION INTRAMUSCULAR; INTRAVENOUS; SUBCUTANEOUS at 06:07

## 2017-08-24 RX ADMIN — Medication 5 ML: at 22:38

## 2017-08-24 RX ADMIN — ONDANSETRON 4 MG: 2 INJECTION INTRAMUSCULAR; INTRAVENOUS at 17:02

## 2017-08-24 RX ADMIN — PANTOPRAZOLE SODIUM 40 MG: 40 TABLET, DELAYED RELEASE ORAL at 08:42

## 2017-08-24 RX ADMIN — LACTOBACILLUS TAB 1 TABLET: TAB at 16:56

## 2017-08-24 RX ADMIN — HYDROCORTISONE: 5 CREAM TOPICAL at 16:55

## 2017-08-24 RX ADMIN — HYDROMORPHONE HYDROCHLORIDE 1 MG: 1 INJECTION, SOLUTION INTRAMUSCULAR; INTRAVENOUS; SUBCUTANEOUS at 16:52

## 2017-08-24 RX ADMIN — METOPROLOL TARTRATE 25 MG: 25 TABLET, FILM COATED ORAL at 08:42

## 2017-08-24 RX ADMIN — METOPROLOL TARTRATE 25 MG: 25 TABLET, FILM COATED ORAL at 16:57

## 2017-08-24 RX ADMIN — CEFAZOLIN 2 G: 1 INJECTION, POWDER, FOR SOLUTION INTRAMUSCULAR; INTRAVENOUS; PARENTERAL at 06:03

## 2017-08-24 RX ADMIN — AMLODIPINE BESYLATE 10 MG: 10 TABLET ORAL at 08:42

## 2017-08-24 RX ADMIN — FLUTICASONE PROPIONATE 2 SPRAY: 50 SPRAY, METERED NASAL at 08:48

## 2017-08-24 RX ADMIN — CEFAZOLIN 2 G: 1 INJECTION, POWDER, FOR SOLUTION INTRAMUSCULAR; INTRAVENOUS; PARENTERAL at 22:34

## 2017-08-24 RX ADMIN — ALBUTEROL SULFATE 2.5 MG: 2.5 SOLUTION RESPIRATORY (INHALATION) at 02:04

## 2017-08-24 RX ADMIN — ZOLPIDEM TARTRATE 5 MG: 5 TABLET ORAL at 22:34

## 2017-08-24 RX ADMIN — HYDROCODONE BITARTRATE AND ACETAMINOPHEN 1 TABLET: 5; 325 TABLET ORAL at 15:36

## 2017-08-24 RX ADMIN — ALBUTEROL SULFATE 2.5 MG: 2.5 SOLUTION RESPIRATORY (INHALATION) at 19:55

## 2017-08-24 RX ADMIN — Medication 10 ML: at 13:09

## 2017-08-24 RX ADMIN — LISINOPRIL 10 MG: 5 TABLET ORAL at 08:42

## 2017-08-24 RX ADMIN — LISINOPRIL 10 MG: 5 TABLET ORAL at 16:56

## 2017-08-24 RX ADMIN — CEFAZOLIN 2 G: 1 INJECTION, POWDER, FOR SOLUTION INTRAMUSCULAR; INTRAVENOUS; PARENTERAL at 13:08

## 2017-08-24 RX ADMIN — LACTOBACILLUS TAB 1 TABLET: TAB at 08:42

## 2017-08-24 RX ADMIN — ALBUTEROL SULFATE 2.5 MG: 2.5 SOLUTION RESPIRATORY (INHALATION) at 08:05

## 2017-08-24 RX ADMIN — HYDROCORTISONE: 5 CREAM TOPICAL at 08:46

## 2017-08-24 NOTE — PROGRESS NOTES
Warfarin dosing per pharmacist    Reymundo Chadwick is a 80 y.o. male. Height: 6' 5\" (195.6 cm)    Weight: 120.2 kg (265 lb)    Indication:  Chronic a fib and history of LE DVT    Goal INR:  2-3    Home dose:  Last notes in 2016: 10 mg on MWF and 7.5 mg S,T,Th,Sa    Risk factors or significant drug interactions:  --    Other anticoagulants:  none    Daily Monitoring  Date  INR     Warfarin dose HGB              Notes  8/20  1.6  7.5 mg  ---  8/21  1.5  7.5 mg  11.6   8/22  1.7  7.5 mg  10.5  8/23  2.0  7.5 mg  10.4  8/24  2.5  5mg      Pharmacy consulted to dose home medication. Pt picks up 5 mg tablets and last documented home dose is 10 mg M, W, F and 7.5 mg all other days. Large jump in INR over the past two days. Will give a lower dose of 5mg tonight. Will continue to follow daily.     Thank you,  Michael Perez, PharmD

## 2017-08-24 NOTE — PROGRESS NOTES
END OF SHIFT NOTE:    INTAKE/OUTPUT  08/23 0701 - 08/24 0700  In: 56 [P.O.:890]  Out: 330 [Urine:330]  Voiding: YES  Catheter: NO  Drain:              Flatus: Patient does have flatus present. Stool:  1 occurrences. Characteristics:       Emesis: 0 occurrences. Characteristics:       VITAL SIGNS  Patient Vitals for the past 12 hrs:   Temp Pulse Resp BP SpO2   08/24/17 1530 97 °F (36.1 °C) 84 18 (!) 160/98 94 %   08/24/17 1440 - - - - 95 %   08/24/17 1130 - - - 116/66 94 %   08/24/17 1100 97.4 °F (36.3 °C) 77 18 116/66 93 %   08/24/17 0805 - - - - 93 %       Pain Assessment  Pain Intensity 1: 0 (08/24/17 1745)  Pain Location 1: Leg  Pain Intervention(s) 1: Medication (see MAR)  Patient Stated Pain Goal: 2    Ambulating  Yes    Shift report given to oncoming nurse at the bedside.     Aline Carias RN

## 2017-08-24 NOTE — PROGRESS NOTES
Hospitalist Progress Note    2017  Admit Date: 2017 10:53 PM   NAME: Heather Morales   :  1934   MRN:  [id number]   Attending: Macarena Heard MD  PCP:  PROVIDER UNKNOWN    SUBJECTIVE:   80 M with HTN, Ch A Fib on Coumadin admitted with Cellulitis of Rt leg with GM +ve Bacteremia, seen by ID.    : No overnight, afebrile, improved pain in Rt leg, better with meds. Nursing notes and chart reviewed. Review of Systems negative with exception of pertinent positives noted above. PHYSICAL EXAM     Visit Vitals    /66    Pulse 77    Temp 97.4 °F (36.3 °C)    Resp 18    Ht 6' 5\" (1.956 m)    Wt 120.2 kg (265 lb)    SpO2 94%    BMI 31.42 kg/m2      Temp (24hrs), Av.1 °F (36.7 °C), Min:97.4 °F (36.3 °C), Max:99.5 °F (37.5 °C)    Oxygen Therapy  O2 Sat (%): 94 % (17 1130)  Pulse via Oximetry: 83 beats per minute (17 0204)  O2 Device: Room air (17 1130)  O2 Flow Rate (L/min): 4 l/min (17 0709)    Intake/Output Summary (Last 24 hours) at 17 1447  Last data filed at 17 1100   Gross per 24 hour   Intake            565.1 ml   Output              630 ml   Net            -64.9 ml       General: No acute distress. Alert.    Lungs:  CTABL. Heart:  RRR, no murmur, rub, or gallop  Abdomen: Soft, non-distended, non-tender, +bs, Umbilical hernia  Extremities: No cyanosis or clubbing. Rt leh swollen, warm and tender  Skin:  Erythematous rash on Rt Leg  Neurologic:  No focal deficits. Moves all extremities. Psych:  Anxious    LABS AND STUDIES:  Personally reviewed all labs, meds, and studies for past 24hrs.       ASSESSMENT      Active Hospital Problems    Diagnosis Date Noted    Umbilical hernia     Hypertension 2017    Chronic atrial fibrillation ([facility] [address] 2017     On Coumadin      Sepsis due to cellulitis ([address] 2017     Rt leg Cellulitis      Cellulitis of right lower leg 2017     Due to Gram Positive Bacteria      Bacteremia 08/20/2017           PLAN:    · C/w IV ancef per ID for Strep Dysgalactiae bacteremia  · -ve US leg for DVTs  · On coumadin for Ch A Fib  · C/w BP meds  · Will consult surgery if Umbilical hernia worsens. · Added O2, nebs    Dispo: Likely in am    DVT ppx:  on coumadin  Discussed plan with pt who is in agreement. All questions answered.     Signed By: Madalyn Ventura MD     August 24, 2017

## 2017-08-24 NOTE — PROGRESS NOTES
Problem: Falls - Risk of  Goal: *Absence of Falls  Document Krishna Fall Risk and appropriate interventions in the flowsheet.    Outcome: Progressing Towards Goal  Fall Risk Interventions:  Mobility Interventions: Communicate number of staff needed for ambulation/transfer, Patient to call before getting OOB           Medication Interventions: Evaluate medications/consider consulting pharmacy, Patient to call before getting OOB, Teach patient to arise slowly     Elimination Interventions: Call light in reach, Patient to call for help with toileting needs, Urinal in reach

## 2017-08-24 NOTE — PROGRESS NOTES
Problem: Mobility Impaired (Adult and Pediatric)  Goal: *Acute Goals and Plan of Care (Insert Text)  LTG:  (1.)Mr. Monica Max will move from supine to sit and sit to supine, scoot up and down and roll side to side INDEPENDENTLY with bed flat within 7 day(s). (2.)Mr. Monica Max will transfer from bed to chair and chair to bed INDEPENDENTLY using the least restrictive device within 7 day(s). (3.)Mr. Monica Max will ambulate with MODIFIED INDEPENDENCE for 350+ feet with the least restrictive device within 7 day(s). (4.)Mr. Monica Max will ascend and descend 6 steps with SUPERVISION using handrail(s) within 7 days. ________________________________________________________________________________________________      PHYSICAL THERAPY: INITIAL ASSESSMENT, AM 8/24/2017  INPATIENT: Hospital Day: 6  Payor: CARE IMPROVEMENT PLUS / [place] / Product Type: [employer] Care Medicare /      NAME/AGE/GENDER: Romero Polk is a 80 y.o. male    PRIMARY DIAGNOSIS: Cellulitis Cellulitis of right lower leg Cellulitis of right lower leg        ICD-10: Treatment Diagnosis:       · Generalized Muscle Weakness (M62.81)  · Difficulty in walking, Not elsewhere classified (R26.2)  · Other abnormalities of gait and mobility (R26.89)   Precaution/Allergies:  Demerol [meperidine]       ASSESSMENT:      Mr. Monica Max is an 80year old male admitted from home for right leg cellulitis. He lives with son who works part time. Pt is independent at baseline with occasional use of cane or walker. Presents in supine with c/o right LE soreness. Agreeable to therapy assessment. Transfers to sitting with supervision but some difficulty and additional time needed. Pt holding breath and becomes SOB; instructed on pursed lip breathing techniques. Pt able to don slippers independently but again with difficulty and holding breath. Needs rest breaks for breathing.  He performs transfers with SBA and ambulates 250 ft in hallway with slow, fairly steady gait pace and walker for support/balance. Educated on posture, walker management, and gait safety. Returned to room and up to chair after activity; O2 sats 94-95% on room air. Positioned with LEs elevated and R LE propped on pillow. Mr. Felicia Arredondo is currently functioning below baseline and is limited by R LE pain and generalized weakness. He will benefit from continued therapy during acute care stay to address deficits and maximize independence with mobility. Anticipate return home at discharge with possible home health PT pending progress. Encouraged to use walker for safety. This section established at most recent assessment   PROBLEM LIST (Impairments causing functional limitations):  1. Decreased Strength  2. Decreased Transfer Abilities  3. Decreased Ambulation Ability/Technique  4. Decreased Balance  5. Increased Pain  6. Decreased Activity Tolerance  7. Decreased Pacing Skills  8. Increased Fatigue  9. Increased Shortness of Breath    INTERVENTIONS PLANNED: (Benefits and precautions of physical therapy have been discussed with the patient.)  1. Balance Exercise  2. Bed Mobility  3. Gait Training  4. Therapeutic Activites  5. Therapeutic Exercise/Strengthening  6. Transfer Training      TREATMENT PLAN: Frequency/Duration: 3-4 times a week for duration of hospital stay  Rehabilitation Potential For Stated Goals: GOOD      RECOMMENDED REHABILITATION/EQUIPMENT: (at time of discharge pending progress): Continue Skilled Therapy and possible home health PT. HISTORY:   History of Present Injury/Illness (Reason for Referral):  Per H&P, Gurpreet Lubin is a 80 y.o. male who presents with right leg pain, fever, and redness. No family members or staff present at time of exam- H&P details procured from conversation with ED physician and available information in EMR. Patient transported by EMS to ED, received an empiric dose of Zosyn.  In ED noted to be febrile- temp at 100.5, with leukocytosis. Patient not a good historian, but he does relate that his right leg hurts, and he wants a cigarette\"     Past Medical History/Comorbidities:   Mr. Adam Banks  has a past medical history of GERD (gastroesophageal reflux disease); HTN (hypertension); Renal insufficiency; and Renal stone. Mr. Adam Banks  has a past surgical history that includes bladder suspension; orthopaedic; and lithotripsy. Social History/Living Environment:   Home Environment: Private residence  # Steps to Enter: 6  Rails to Enter: Yes  Hand Rails : Bilateral  Wheelchair Ramp: No  One/Two Story Residence: Two story, live on 1st floor  Lift Chair Available: No  Living Alone: No  Support Systems: Child(anisa), Family member(s)  Patient Expects to be Discharged to[de-identified] Private residence  Current DME Used/Available at Home: Cherevin Dings, straight, Walker, rollator  Prior Level of Function/Work/Activity:  Carlos Alberto Marie lives at home; son lives with him and works part time. Pt is independent with mobility, ambulation, and ADLs with occasional use of cane or walker. Stopped driving recently due to visual deficits in left eye. Denies falls or home O2 use. Number of Personal Factors/Comorbidities that affect the Plan of Care: 1-2: MODERATE COMPLEXITY   EXAMINATION:   Most Recent Physical Functioning:   Gross Assessment:  AROM: Within functional limits  Strength: Generally decreased, functional  Coordination: Within functional limits               Posture:  Posture (WDL): Exceptions to WDL  Posture Assessment:  Forward head, Rounded shoulders  Balance:  Sitting: Intact  Standing: Impaired  Standing - Static: Good  Standing - Dynamic : Fair (+ to good) Bed Mobility:  Rolling: Supervision  Supine to Sit: Supervision  Scooting: Supervision  Wheelchair Mobility:     Transfers:  Sit to Stand: Stand-by asssistance  Stand to Sit: Stand-by asssistance  Bed to Chair: Stand-by asssistance  Gait:     Base of Support: Center of gravity altered; Widened  Speed/Frances: Slow;Delayed  Step Length: Left shortened;Right shortened  Gait Abnormalities: Step to gait; Antalgic  Distance (ft): 250 Feet (ft)  Assistive Device: Walker, rolling  Ambulation - Level of Assistance: Stand-by asssistance  Interventions: Verbal cues; Visual/Demos; Safety awareness training       Body Structures Involved:  1. Nerves  2. Metabolic Body Functions Affected:  1. Sensory/Pain  2. Respiratory  3. Movement Related  4. Skin Related Activities and Participation Affected:  1. General Tasks and Demands  2. Mobility  3. Self Care  4. Domestic Life  5. Community, Social and [place]   Number of elements that affect the Plan of Care: 4+: HIGH COMPLEXITY   CLINICAL PRESENTATION:   Presentation: Stable and uncomplicated: LOW COMPLEXITY   CLINICAL DECISION MAKIN [facility] Inpatient Short Form  How much difficulty does the patient currently have. .. Unable A Lot A Little None   1. Turning over in bed (including adjusting bedclothes, sheets and blankets)? [ ] 1   [ ] 2   [ ] 3   [X] 4   2. Sitting down on and standing up from a chair with arms ( e.g., wheelchair, bedside commode, etc.)   [ ] 1   [ ] 2   [ ] 3   [X] 4   3. Moving from lying on back to sitting on the side of the bed? [ ] 1   [ ] 2   [ ] 3   [X] 4   How much help from another person does the patient currently need. .. Total A Lot A Little None   4. Moving to and from a bed to a chair (including a wheelchair)? [ ] 1   [ ] 2   [X] 3   [ ] 4   5. Need to walk in hospital room? [ ] 1   [ ] 2   [X] 3   [ ] 4   6. Climbing 3-5 steps with a railing? [ ] 1   [ ] 2   [X] 3   [ ] 4   © , Trustees of [address] [place] [address] [address] [address], under license to [employer]. All rights reserved    Score:  Initial: 21 Most Recent: X (Date: -- )     Interpretation of Tool:  Represents activities that are increasingly more difficult (i.e. Bed mobility, Transfers, Gait).        Score 24 23 22-20 19-15 14-10 9-7 6       Modifier CH CI CJ CK CL CM CN         · Mobility - Walking and Moving Around:               - CURRENT STATUS:    CJ - 20%-39% impaired, limited or restricted               - GOAL STATUS:           CH - 0% impaired, limited or restricted               - D/C STATUS:                       ---------------To be determined---------------  Payor: CARE IMPROVEMENT PLUS / [place] / Product Type: [employer] Medicare /       Medical Necessity:     · Patient demonstrates good rehab potential due to higher previous functional level. Reason for Services/Other Comments:  · Patient continues to demonstrate capacity to improve mobility, balance, activity tolerance, gait mechanics which will increase independence and increase safety. Use of outcome tool(s) and clinical judgement create a POC that gives a: Clear prediction of patient's progress: LOW COMPLEXITY                 TREATMENT:   (In addition to Assessment/Re-Assessment sessions the following treatments were rendered)   Pre-treatment Symptoms/Complaints:  \"thank you\"  Pain: Initial:   Pain Intensity 1: 2  Pain Location 1: Leg  Pain Orientation 1: Right  Pain Intervention(s) 1: Repositioned  Post Session:  0/10      Assessment/Reassessment only, no treatment provided today     Braces/Orthotics/Lines/Etc:   · O2 Device: Room air  Treatment/Session Assessment:    · Response to Treatment:  Pt performs mobility with SBA-CGA at times. Limited by R LE pain  · Interdisciplinary Collaboration:  · Physical Therapist  · Registered Nurse  · After treatment position/precautions:  · Up in chair  · Bed alarm/tab alert on  · Bed/Chair-wheels locked  · Bed in low position  · Call light within reach  · Compliance with Program/Exercises: compliant all of the time. · Recommendations/Intent for next treatment session:   \"Next visit will focus on advancements to more challenging activities and reduction in assistance provided\".   Total Treatment Duration:  PT Patient Time In/Time Out  Time In: 1105  Time Out: 3239 [facility]

## 2017-08-25 VITALS
WEIGHT: 265 LBS | TEMPERATURE: 98 F | BODY MASS INDEX: 31.29 KG/M2 | HEIGHT: 77 IN | SYSTOLIC BLOOD PRESSURE: 117 MMHG | DIASTOLIC BLOOD PRESSURE: 59 MMHG | RESPIRATION RATE: 18 BRPM | HEART RATE: 88 BPM | OXYGEN SATURATION: 90 %

## 2017-08-25 LAB
INR PPP: 3 (ref 0.9–1.2)
PROTHROMBIN TIME: 33.4 SEC (ref 9.6–12)

## 2017-08-25 PROCEDURE — 74011250637 HC RX REV CODE- 250/637: Performed by: INTERNAL MEDICINE

## 2017-08-25 PROCEDURE — 74011250636 HC RX REV CODE- 250/636: Performed by: NURSE PRACTITIONER

## 2017-08-25 PROCEDURE — 74011250637 HC RX REV CODE- 250/637: Performed by: HOSPITALIST

## 2017-08-25 PROCEDURE — 74011000250 HC RX REV CODE- 250: Performed by: INTERNAL MEDICINE

## 2017-08-25 PROCEDURE — 94760 N-INVAS EAR/PLS OXIMETRY 1: CPT

## 2017-08-25 PROCEDURE — 85610 PROTHROMBIN TIME: CPT | Performed by: INTERNAL MEDICINE

## 2017-08-25 PROCEDURE — 94640 AIRWAY INHALATION TREATMENT: CPT

## 2017-08-25 PROCEDURE — 36415 COLL VENOUS BLD VENIPUNCTURE: CPT | Performed by: INTERNAL MEDICINE

## 2017-08-25 RX ORDER — UREA 10 %
1 LOTION (ML) TOPICAL 2 TIMES DAILY
Qty: 20 TAB | Refills: 0 | Status: SHIPPED | OUTPATIENT
Start: 2017-08-25 | End: 2017-09-04

## 2017-08-25 RX ORDER — POTASSIUM CHLORIDE 1.5 G/1.77G
40 POWDER, FOR SOLUTION ORAL
Status: COMPLETED | OUTPATIENT
Start: 2017-08-25 | End: 2017-08-25

## 2017-08-25 RX ORDER — CLINDAMYCIN HYDROCHLORIDE 300 MG/1
300 CAPSULE ORAL 3 TIMES DAILY
Qty: 24 CAP | Refills: 0 | Status: SHIPPED | OUTPATIENT
Start: 2017-08-25 | End: 2017-09-02

## 2017-08-25 RX ADMIN — LISINOPRIL 10 MG: 5 TABLET ORAL at 09:25

## 2017-08-25 RX ADMIN — ALBUTEROL SULFATE 2.5 MG: 2.5 SOLUTION RESPIRATORY (INHALATION) at 01:23

## 2017-08-25 RX ADMIN — ALBUTEROL SULFATE 2.5 MG: 2.5 SOLUTION RESPIRATORY (INHALATION) at 08:04

## 2017-08-25 RX ADMIN — METOPROLOL TARTRATE 25 MG: 25 TABLET, FILM COATED ORAL at 09:26

## 2017-08-25 RX ADMIN — POTASSIUM CHLORIDE 40 MEQ: 1.5 POWDER, FOR SOLUTION ORAL at 09:27

## 2017-08-25 RX ADMIN — LACTOBACILLUS TAB 1 TABLET: TAB at 09:25

## 2017-08-25 RX ADMIN — Medication 10 ML: at 06:33

## 2017-08-25 RX ADMIN — PANTOPRAZOLE SODIUM 40 MG: 40 TABLET, DELAYED RELEASE ORAL at 09:27

## 2017-08-25 RX ADMIN — HYDROCODONE BITARTRATE AND ACETAMINOPHEN 1 TABLET: 5; 325 TABLET ORAL at 06:49

## 2017-08-25 RX ADMIN — CEFAZOLIN 2 G: 1 INJECTION, POWDER, FOR SOLUTION INTRAMUSCULAR; INTRAVENOUS; PARENTERAL at 06:33

## 2017-08-25 RX ADMIN — HYDROCORTISONE: 5 CREAM TOPICAL at 09:24

## 2017-08-25 RX ADMIN — AMLODIPINE BESYLATE 10 MG: 10 TABLET ORAL at 09:24

## 2017-08-25 RX ADMIN — FLUTICASONE PROPIONATE 2 SPRAY: 50 SPRAY, METERED NASAL at 09:24

## 2017-08-25 NOTE — PROGRESS NOTES
Infectious Disease Progress Note    Today's Date: 2017   Admit Date: 2017    Impression:   · Grp G strep bacteremia (strep dysgalactiae; levaquin sensitive) and CONS bacteremia (). Follow up [facility]  NTD, pending, TTE negative  · RLE cellulitis  · CHF with chronic LE edema  · CKD3  · Elevated total bilirubin     Plan:   · Recommend 10 days of levaquin at discharge. · He can follow up with his PCP. I gave him our card in case the leg worsens. · Discussed elevation of the leg at home and pt voiced understanding. Anti-infectives:   Vanc (-)  Cefazolin (-  Zosyn (-)      Subjective:   Prepped for discharge. Allergies   Allergen Reactions    Demerol [Meperidine] Nausea and Vomiting        Review of Systems:  Pertinent items are noted in the History of Present Illness.     Objective:     Visit Vitals    /59    Pulse 88    Temp 98 °F (36.7 °C)    Resp 18    Ht 6' 5\" (1.956 m)    Wt 120.2 kg (265 lb)    SpO2 90%    BMI 31.42 kg/m2     Temp (24hrs), Av.8 °F (36.6 °C), Min:97.3 °F (36.3 °C), Max:98.1 °F (36.7 °C)       Lines:  Peripheral IV:  LUE intact          Physical Exam:    General:  Alert, cooperative, no distress   Eyes:     Mouth/Throat:    Neck:    Lungs:      CV:     Abdomen:      Extremities:    Skin: RLE cellulitis (receeding margins), moderate warmth and erythema, no drainage   Lymph nodes:    Musculoskeletal:    Lines/Devices:  Intact, no erythema, drainage or tenderness   Psych: Alert and oriented, normal mood affect given the setting         Data Review:     CBC:  Recent Labs      17   0605  17   0616   WBC  10.9  14.5*   GRANS  85*  89*   MONOS  10  6   EOS  1  0*   ANEU  9.4*  12.9*   ABL  0.3*  0.7   HGB  10.7*  10.4*   HCT  32.1*  32.3*   PLT  162  131*       BMP:  Recent Labs      17   0616   CREA  0.90   BUN  15   NA  139   K  3.0*   CL  105   CO2  27   AGAP  7   GLU  122*       LFTS:  Recent Labs      17   0616   TBILI 1.5*   ALT  15   SGOT  21   AP  123   TP  6.3   ALB  2.2*       Microbiology:     All Micro Results     Procedure Component Value Units Date/Time    CULTURE, BLOOD [[id number]] Collected:  17 0710    Order Status:  Completed Specimen:  Blood from Blood Updated:  17 0756     Special Requests: LEFT ARM        Culture result: NO GROWTH 4 DAYS       CULTURE, BLOOD [[id number]]  (Abnormal)  (Susceptibility) Collected:  17 2300    Order Status:  Completed Specimen:  Blood from Blood Updated:  17 0647     Special Requests: RIGHT ANTECUBITAL        GRAM STAIN         GRAM POS COCCI IN CLUSTERS      AEROBIC BOTTLE POSITIVE                 RESULTS VERIFIED, PHONED TO AND READ BACK BY TRICIA VÁZQUEZ RN AT 7125 ON 17 SG     Culture result:         STAPHYLOCOCCUS HOMINIS (A)              SA target DNA sequence not detected within the sample. Test performed by PCR    CULTURE, BLOOD [[id number]]  (Abnormal)  (Susceptibility) Collected:  17 2140    Order Status:  Completed Specimen:  Blood from Blood Updated:  17 1437     Special Requests: LEFT ANTECUBITAL        GRAM STAIN GRAM POS COCCI IN CHAINS         ANAEROBIC BOTTLE POSITIVE                 RESULTS VERIFIED, PHONED TO AND READ BACK BY Irish Izaguirre @1041 ON 17 AK. Culture result:         STREPTOCOCCUS DYSGALACTIAE GROUP C (A)          Imagin17 CT abd/pelvis: IMPRESSION:        1.  Multiple bilateral renal cysts, including one measuring 1.8 cm at the right  renal lower pole corresponding to the nodule which was indeterminate on recent  ultrasound. No suspicious solid renal mass is identified.     2.  5 cm fat-containing umbilical hernia.     3. Extensive atherosclerosis.     Signed By: Javier Cat MD     2017

## 2017-08-25 NOTE — PROGRESS NOTES
Patient discharged to home with belongings, discharge papers and prescription. Pt denies needs at this time. No distress or changes noted. Pt transported to pt dc area via wheelchair accompanied by hospital personel.

## 2017-08-25 NOTE — PROGRESS NOTES
END OF SHIFT NOTE:    INTAKE/OUTPUT  08/24 0701 - 08/25 0700  In: 445.1 [P.O.:350; I.V.:95.1]  Out: 500 [Urine:500]  Voiding: YES  Catheter: NO  Drain:              Flatus: Patient does have flatus present. Stool:  0 occurrences. Characteristics:       Emesis: 0 occurrences. Characteristics:        VITAL SIGNS  Patient Vitals for the past 12 hrs:   Temp Pulse Resp BP SpO2   08/25/17 0123 - - - - 93 %   08/24/17 2240 97.3 °F (36.3 °C) 98 19 144/76 90 %   08/24/17 2000 98.1 °F (36.7 °C) 89 19 146/77 (!) 88 %   08/24/17 1955 - - - - 90 %       Pain Assessment  Pain Intensity 1: 8 (08/24/17 2337)  Pain Location 1: Leg  Pain Intervention(s) 1: Medication (see MAR)  Patient Stated Pain Goal: 2    Ambulating  Yes    Shift report will be given to oncoming nurse at the bedside.     Payton Adames RN

## 2017-08-25 NOTE — DISCHARGE INSTRUCTIONS
DISCHARGE SUMMARY from Nurse    The following personal items are in your possession at time of discharge:    Dental Appliances: None        Home Medications: None  Jewelry: None  Clothing: With patient, Socks, Shirt, Pants, Footwear  Other Valuables: At bedside             PATIENT INSTRUCTIONS:    After general anesthesia or intravenous sedation, for 24 hours or while taking prescription Narcotics:  · Limit your activities  · Do not drive and operate hazardous machinery  · Do not make important personal or business decisions  · Do  not drink alcoholic beverages  · If you have not urinated within 8 hours after discharge, please contact your surgeon on call. Report the following to your surgeon:  · Excessive pain, swelling, redness or odor of or around the surgical area  · Temperature over 100.5  · Nausea and vomiting lasting longer than 4 hours or if unable to take medications  · Any signs of decreased circulation or nerve impairment to extremity: change in color, persistent  numbness, tingling, coldness or increase pain  · Any questions        What to do at Home:  Recommended activity: Activity as tolerated, per MD instructions    If you experience any of the following symptoms fever > 100.5, nausea, vomiting, pain, chest pain, shortness of breath please follow up with MD.      *  Please give a list of your current medications to your Primary Care Provider. *  Please update this list whenever your medications are discontinued, doses are      changed, or new medications (including over-the-counter products) are added. *  Please carry medication information at all times in case of emergency situations. These are general instructions for a healthy lifestyle:    No smoking/ No tobacco products/ Avoid exposure to second hand smoke    Surgeon General's Warning:  Quitting smoking now greatly reduces serious risk to your health.     Obesity, smoking, and sedentary lifestyle greatly increases your risk for illness    A healthy diet, regular physical exercise & weight monitoring are important for maintaining a healthy lifestyle    You may be retaining fluid if you have a history of heart failure or if you experience any of the following symptoms:  Weight gain of 3 pounds or more overnight or 5 pounds in a week, increased swelling in our hands or feet or shortness of breath while lying flat in bed. Please call your doctor as soon as you notice any of these symptoms; do not wait until your next office visit. Recognize signs and symptoms of STROKE:    F-face looks uneven    A-arms unable to move or move unevenly    S-speech slurred or non-existent    T-time-call 911 as soon as signs and symptoms begin-DO NOT go       Back to bed or wait to see if you get better-TIME IS BRAIN. Warning Signs of HEART ATTACK     Call 911 if you have these symptoms:   Chest discomfort. Most heart attacks involve discomfort in the center of the chest that lasts more than a few minutes, or that goes away and comes back. It can feel like uncomfortable pressure, squeezing, fullness, or pain.  Discomfort in other areas of the upper body. Symptoms can include pain or discomfort in one or both arms, the back, neck, jaw, or stomach.  Shortness of breath with or without chest discomfort.  Other signs may include breaking out in a cold sweat, nausea, or lightheadedness. Don't wait more than five minutes to call 911 - MINUTES MATTER! Fast action can save your life. Calling 911 is almost always the fastest way to get lifesaving treatment. Emergency Medical Services staff can begin treatment when they arrive -- up to an hour sooner than if someone gets to the hospital by car. The discharge information has been reviewed with the patient. The patient verbalized understanding. Discharge medications reviewed with the patient and appropriate educational materials and side effects teaching were provided.                  Cellulitis: Care Instructions  Your Care Instructions    Cellulitis is a skin infection. It often occurs after a break in the skin from a scrape, cut, bite, or puncture, or after a rash. The doctor has checked you carefully, but problems can develop later. If you notice any problems or new symptoms, get medical treatment right away. Follow-up care is a key part of your treatment and safety. Be sure to make and go to all appointments, and call your doctor if you are having problems. It's also a good idea to know your test results and keep a list of the medicines you take. How can you care for yourself at home? · Take your antibiotics as directed. Do not stop taking them just because you feel better. You need to take the full course of antibiotics. · Prop up the infected area on pillows to reduce pain and swelling. Try to keep the area above the level of your heart as often as you can. · If your doctor told you how to care for your wound, follow your doctor's instructions. If you did not get instructions, follow this general advice:  ¨ Wash the wound with clean water 2 times a day. Don't use hydrogen peroxide or alcohol, which can slow healing. ¨ You may cover the wound with a thin layer of petroleum jelly, such as Vaseline, and a nonstick bandage. ¨ Apply more petroleum jelly and replace the bandage as needed. · Be safe with medicines. Take pain medicines exactly as directed. ¨ If the doctor gave you a prescription medicine for pain, take it as prescribed. ¨ If you are not taking a prescription pain medicine, ask your doctor if you can take an over-the-counter medicine. To prevent cellulitis in the future  · Try to prevent cuts, scrapes, or other injuries to your skin. Cellulitis most often occurs where there is a break in the skin. · If you get a scrape, cut, mild burn, or bite, wash the wound with clean water as soon as you can to help avoid infection.  Don't use hydrogen peroxide or alcohol, which can slow healing. · If you have swelling in your legs (edema), support stockings and good skin care may help prevent leg sores and cellulitis. · Take care of your feet, especially if you have diabetes or other conditions that increase the risk of infection. Wear shoes and socks. Do not go barefoot. If you have athlete's foot or other skin problems on your feet, talk to your doctor about how to treat them. When should you call for help? Call your doctor now or seek immediate medical care if:  · You have signs that your infection is getting worse, such as:  ¨ Increased pain, swelling, warmth, or redness. ¨ Red streaks leading from the area. ¨ Pus draining from the area. ¨ A fever. · You get a rash. Watch closely for changes in your health, and be sure to contact your doctor if:  · You are not getting better after 1 day (24 hours). · You do not get better as expected. Where can you learn more? Go to http://mil-georgette.info/. Dilcia Mcintosh in the search box to learn more about \"Cellulitis: Care Instructions. \"  Current as of: October 13, 2016  Content Version: 11.3  © 8940-9886 [employer]. Care instructions adapted under license by [employer] (which disclaims liability or warranty for this information). If you have questions about a medical condition or this instruction, always ask your healthcare professional. [place] [address] any warranty or liability for your use of this information.

## 2017-08-25 NOTE — PROGRESS NOTES
Problem: Falls - Risk of  Goal: *Absence of Falls  Document Krishna Fall Risk and appropriate interventions in the flowsheet.    Outcome: Progressing Towards Goal  Fall Risk Interventions:  Mobility Interventions: Communicate number of staff needed for ambulation/transfer, Patient to call before getting OOB           Medication Interventions: Evaluate medications/consider consulting pharmacy, Patient to call before getting OOB, Teach patient to arise slowly     Elimination Interventions: Call light in reach, Patient to call for help with toileting needs, Toilet paper/wipes in reach, Urinal in reach

## 2017-08-25 NOTE — PROGRESS NOTES
Care Management Interventions  Mode of Transport at Discharge: Other (see comment)  Transition of Care Consult (CM Consult): Home Health (NO CM CONSULT RECEIVED)  [address] No  Reason Outside [place]: Patient declined ordered home care/hospice services  Discharge Durable Medical Equipment: No (pt has a cane and walker in the home)  Physical Therapy Consult: Yes  Occupational Therapy Consult: No  Current Support Network: Relative's Home (lives with son)  Confirm Follow Up Transport: Family  Plan discussed with Pt/Family/Caregiver: Yes  Freedom of Choice Offered: Yes  Discharge Location  Discharge Placement: Home with family assistance    Pt is an 79 yo male admitted due to cellulitis. Pt is medically cleared for discharge to home today. MD ordered [facility] PT/OT services but pt declined these services. Pt denied any discharge needs or concerns. There were no other apparent discharge needs.

## 2017-08-25 NOTE — PROGRESS NOTES
Infectious Disease Progress Note    Today's Date: 2017   Admit Date: 2017    Impression:   · Grp G strep bacteremia (strep dysgalactiae) and CONS bacteremia (). Follow up [facility]  NTD, pending, TTE negative  · RLE cellulitis  · CHF with chronic LE edema  · CKD3  · Elevated total bilirubin     Plan:   · Continue Cefazolin while he is in-patient, monitor WBC, cellulitis  · He will be able to transition to Levaquin 750mg PO daily at discharge  · He needs to elevate the leg consistently. · Recommend an additional 10 days of levaquin at discharge. Anti-infectives:   Vanc (-)  Cefazolin (-  Zosyn (-)      Subjective:   Ongoing R leg pain    Allergies   Allergen Reactions    Demerol [Meperidine] Nausea and Vomiting        Review of Systems:  Pertinent items are noted in the History of Present Illness. Objective:     Visit Vitals    BP (!) 160/98    Pulse 84    Temp 97 °F (36.1 °C)    Resp 18    Ht 6' 5\" (1.956 m)    Wt 120.2 kg (265 lb)    SpO2 90%    BMI 31.42 kg/m2     Temp (24hrs), Av °F (36.7 °C), Min:97 °F (36.1 °C), Max:99.5 °F (37.5 °C)       Lines:  Peripheral IV:  LUE intact          Physical Exam:    General:  Alert, cooperative, no distress   Eyes:  Sclera anicteric. Mouth/Throat: dentition poor   Neck: Supple   Lungs:   Clear to auscultation bilaterally, good effort   CV:  Regular rate and rhythm,no murmur, click, rub or gallop   Abdomen:   non-distended   Extremities: Manohar LE edema, R>L   Skin: Skin color, texture, turgor normal. no acute rash or lesions.  RLE cellulitis (receeding margins), moderate warmth and erythema, no drainage   Lymph nodes:    Musculoskeletal: No swelling or deformity   Lines/Devices:  Intact, no erythema, drainage or tenderness   Psych: Alert and oriented, normal mood affect given the setting         Data Review:     CBC:  Recent Labs      17   0605  17   0616  17   0653   WBC  10.9  14.5*  13.9*   GRANS  85*  89* 87*   MONOS  10  6  10   EOS  1  0*  0*   ANEU  9.4*  12.9*  12.1*   ABL  0.3*  0.7  0.4*   HGB  10.7*  10.4*  10.5*   HCT  32.1*  32.3*  32.7*   PLT  162  131*  127*       BMP:  Recent Labs      17   0616  17   0653   CREA  0.90  0.89   BUN  15  18   NA  139  138   K  3.0*  3.0*   CL  105  104   CO2  27  26   AGAP  7  8   GLU  122*  109*       LFTS:  Recent Labs      17   0616   TBILI  1.5*   ALT  15   SGOT  21   AP  123   TP  6.3   ALB  2.2*       Microbiology:     All Micro Results     Procedure Component Value Units Date/Time    CULTURE, BLOOD [[id number]] Collected:  17 0710    Order Status:  Completed Specimen:  Blood from Blood Updated:  17 0700     Special Requests: LEFT ARM        Culture result: NO GROWTH 3 DAYS       CULTURE, BLOOD [[id number]]  (Abnormal)  (Susceptibility) Collected:  17 2300    Order Status:  Completed Specimen:  Blood from Blood Updated:  17 0647     Special Requests: RIGHT ANTECUBITAL        GRAM STAIN         GRAM POS COCCI IN CLUSTERS      AEROBIC BOTTLE POSITIVE                 RESULTS VERIFIED, PHONED TO AND READ BACK BY TRICIA VÁZQUEZ RN AT 1344 ON 17 SG     Culture result:         STAPHYLOCOCCUS HOMINIS (A)              SA target DNA sequence not detected within the sample. Test performed by PCR    CULTURE, BLOOD [[id number]]  (Abnormal)  (Susceptibility) Collected:  17 2140    Order Status:  Completed Specimen:  Blood from Blood Updated:  17 1437     Special Requests: LEFT ANTECUBITAL        GRAM STAIN GRAM POS COCCI IN CHAINS         ANAEROBIC BOTTLE POSITIVE                 RESULTS VERIFIED, PHONED TO AND READ BACK BY Deborah Reza @1043 ON 17 AK.      Culture result:         STREPTOCOCCUS DYSGALACTIAE GROUP C (A)          Imagin17 CT abd/pelvis: IMPRESSION:        1.  Multiple bilateral renal cysts, including one measuring 1.8 cm at the right  renal lower pole corresponding to the nodule which was indeterminate on recent  ultrasound. No suspicious solid renal mass is identified.     2.  5 cm fat-containing umbilical hernia.     3. Extensive atherosclerosis.     Signed By: Desiree Baker MD     August 24, 2017

## 2017-08-25 NOTE — DISCHARGE SUMMARY
Hospitalist Discharge Summary     Patient ID:  Gurdeep Gordillo  [id number]  8 y.o.  1934  Admit date: 8/19/2017 10:53 PM  Discharge date and time: 8/25/2017  Attending: Guy Crane MD  PCP:  PROVIDER UNKNOWN  Treatment Team: Attending Provider: Guy Crane MD; Consulting Provider: Javier Cat MD; Utilization Review: Ruperto Ham RN    Principal Diagnosis Cellulitis of right lower leg   Principal Problem:    Cellulitis of right lower leg (8/20/2017)      Overview: Due to Gram Positive Bacteria    Active Problems:    Bacteremia (5/92/6003)      Umbilical hernia (1/73/1338)      Hypertension (8/22/2017)      Chronic atrial fibrillation ([facility] [address] (8/22/2017)      Overview: On Coumadin      Sepsis due to cellulitis ([facility] (8/22/2017)      Overview: Rt leg Cellulitis       HPI:    Gurdeep Gordillo is a 80 y.o. male who presents with right leg pain, fever, and redness. No family members or staff present at time of exam- H&P details procured from conversation with ED physician and available information in EMR. Patient transported by EMS to ED, received an empiric dose of Zosyn. In ED noted to be febrile- temp at 100.5, with leukocytosis. Patient not a good historian, but he does relate that his right leg hurts. Hospital Course:    He was started on IV Abx. One blood Cx was +ve for Strep Dysgalactiae Group C and ID was consulted. The 2nd blood Cx was +ve for Staph Hominis thought likely to be a contaminant by ID. His leg swelling improved and ID recommended Levaquin for 10 days. But pt besides being on Warfarin and a poor historian, it seems he is getting meds [facility] multiple sources [facility]. I am prescribing Clindamycin with some probiotics for 8 days. His INR is 3.0 and he has been instructed to take 5mg of Warfarin and get his INR checked on Monday at his PCP clinic. Plan discussed with pt who is in agreement with the plan. All questions answered. Pt was stable at time of discharge. Follow up as per below. Significant Diagnostic Testing:       Culture result:  (A)    Final      STREPTOCOCCUS DYSGALACTIAE GROUP C       Culture & Susceptibility      STREPTOCOCCUS DYSGALACTIAE GROUP C      Antibiotic Sensitivity SHARON Unit Status     Clindamycin ($) Susceptible 0.0625 ug/mL Final     Method: SHARON     Levofloxacin ($) Susceptible 1 ug/mL Final     Comment: RELEASED PER XIOMY GUTIERREZ     Method: SHARON     Penicillin G ($$) Susceptible <=0.39309 ug/mL Final     Method: SHARON     Vancomycin ($) Susceptible 0.5 ug/mL Final     Method: SHARON                 TTE:    LEFT VENTRICLE: The ventricle was mildly dilated. Systolic function was at   the  lower limits of normal. Ejection fraction was estimated in the range of 50 %   to  55 %. There were no regional wall motion abnormalities. Wall thickness was  normal. The study was not technically sufficient to allow evaluation of LV  diastolic function. RIGHT VENTRICLE: The ventricle was dilated. Systolic function was normal.   There  was moderate pulmonary artery hypertension. Estimated peak pressure was in   the  range of 50-55 mmHg. LEFT ATRIUM: The atrium was moderately to markedly dilated. RIGHT ATRIUM: The atrium was moderately to markedly dilated. SYSTEMIC VEINS: IVC: The inferior vena cava was dilated. AORTIC VALVE: The valve was trileaflet. Leaflets exhibited mild calcification  and mild sclerosis. There was no evidence for stenosis. There was no  insufficiency. MITRAL VALVE: There was mild annular calcification. There was minimal  calcification of the posterior leaflet, limited to the leaflet base. There   was  no evidence for stenosis. There was mild regurgitation. TRICUSPID VALVE: The valve structure was normal. There was no evidence for  stenosis. There was mild to moderate regurgitation. PULMONIC VALVE: Not well visualized. There was no evidence for stenosis. There  was no insufficiency.     PERICARDIUM: There was no pericardial effusion. AORTA: Ascending aorta was mildly dilated. The root exhibited dilatation. SUMMARY:    -  Left ventricle: The ventricle was mildly dilated. Systolic function was at  the lower limits of normal. Ejection fraction was estimated in the range of   50  % to 55 %. There were no regional wall motion abnormalities. -  Right ventricle: The ventricle was dilated. There was moderate pulmonary  artery hypertension.    -  Left atrium: The atrium was moderately to markedly dilated. -  Right atrium: The atrium was moderately to markedly dilated. -  Inferior vena cava, hepatic veins: The inferior vena cava was dilated. -  Aortic valve: The valve was trileaflet. Leaflets exhibited mild  calcification and mild sclerosis. -  Mitral valve: There was mild annular calcification. There was mild  regurgitation. -  Tricuspid valve: There was mild to moderate regurgitation. Dorma Rasta, systemic arteries: Ascending aorta was mildly dilated. Labs: Results:       Chemistry Recent Labs      08/23/17   0616   GLU  122*   NA  139   K  3.0*   CL  105   CO2  27   BUN  15   CREA  0.90   CA  8.8   AGAP  7   AP  123   TP  6.3   ALB  2.2*   GLOB  4.1*   AGRAT  0.5*      CBC w/Diff Recent Labs      08/24/17   0605  08/23/17   0616   WBC  10.9  14.5*   RBC  4.04*  3.95*   HGB  10.7*  10.4*   HCT  32.1*  32.3*   PLT  162  131*   GRANS  85*  89*   LYMPH  3*  5*   EOS  1  0*      Cardiac Enzymes No results for input(s): CPK, CKND1, PRERNA in the last 72 hours. No lab exists for component: CKRMB, TROIP   Coagulation Recent Labs      08/25/17   0639  08/24/17   0605   PTP  33.4*  27.9*   INR  3.0*  2.5*       Last A1c No results found for: HBA1C, HGBE8, GEF3MOPF, TRX0XLVE   Lipid Panel No results found for: CHOL, CHOLPOCT, CHOLX, CHLST, CHOLV, 681787, HDL, LDL, LDLC, DLDLP, 534262, VLDLC, VLDL, TGLX, TRIGL, TRIGP, TGLPOCT, CHHD, CHHDX   BNP No results for input(s): BNPP in the last 72 hours.    Liver Enzymes Recent Labs 08/23/17   0616   TP  6.3   ALB  2.2*   AP  123   SGOT  21      Thyroid Studies No results found for: T4, T3U, TSH, TSHEXT         Discharge Exam:  Patient Vitals for the past 24 hrs:   Temp Pulse Resp BP SpO2   08/25/17 0804 - - - - 90 %   08/25/17 0717 98 °F (36.7 °C) 88 18 117/59 93 %   08/25/17 0123 - - - - 93 %   08/24/17 2240 97.3 °F (36.3 °C) 98 19 144/76 90 %   08/24/17 2000 98.1 °F (36.7 °C) 89 19 146/77 (!) 88 %   08/24/17 1955 - - - - 90 %   08/24/17 1530 97 °F (36.1 °C) 84 18 (!) 160/98 94 %   08/24/17 1440 - - - - 95 %   08/24/17 1130 - - - 116/66 94 %   08/24/17 1100 97.4 °F (36.3 °C) 77 18 116/66 93 %     Visit Vitals    /59    Pulse 88    Temp 98 °F (36.7 °C)    Resp 18    Ht 6' 5\" (1.956 m)    Wt 120.2 kg (265 lb)    SpO2 90%    BMI 31.42 kg/m2     General appearance: alert, cooperative, no distress  Lungs: CTABL  Heart: RRR, no m/r/g  Abdomen: soft, non-tender. Bowel sounds normal.   Extremities: no cyanosis. LLE swollen  Neurologic: Grossly normal    Disposition: Home with HHPT  Discharge Condition: stable  Patient Instructions: cardiac diet  Activity as tolerated   Current Discharge Medication List      START taking these medications    Details   Lactobacillus Acidoph & Bulgar (FLORANEX) 1 million cell tab tablet Take 1 Tab by mouth two (2) times a day for 10 days. Qty: 20 Tab, Refills: 0      clindamycin (CLEOCIN) 300 mg capsule Take 1 Cap by mouth three (3) times daily for 8 days. Qty: 24 Cap, Refills: 0         CONTINUE these medications which have NOT CHANGED    Details   furosemide (LASIX) 20 mg tablet Take 20 mg by mouth two (2) times a day. !! warfarin (COUMADIN) 7.5 mg tablet Take 7.5 mg by mouth every Tuesday, Thursday, Saturday & Sunday. gabapentin (NEURONTIN) 100 mg capsule Take 100 mg by mouth nightly. 1-4 tabs      amLODIPine (NORVASC) 10 mg tablet Take 10 mg by mouth daily. omeprazole (PRILOSEC) 20 mg capsule Take 20 mg by mouth daily.       metoprolol (LOPRESSOR) 50 mg tablet Take 25 mg by mouth two (2) times a day. !! warfarin (COUMADIN) 7.5 mg tablet Take 10 mg by mouth DIALYSIS MON, WED & FRI. lisinopril (PRINIVIL, ZESTRIL) 20 mg tablet Take 10 mg by mouth two (2) times a day. oxyCODONE IR (OXY-IR) 15 mg immediate release tablet Take 15 mg by mouth every eight (8) hours as needed. hydrocortisone (HYTONE) 2.5 % topical cream Apply  to affected area two (2) times a day. use thin layer  Qty: 30 g, Refills: 0      diphenhydrAMINE-zinc acetate 2%-0.1% (BENADRYL ITCH STOPPING) 2-0.1 % topical cream Apply  to affected area two (2) times daily as needed for Itching. Qty: 30 g, Refills: 0       !! - Potential duplicate medications found. Please discuss with provider. Vaccinations:   Pt screened by nursing for pneumococcal and influenza vaccination needs at discharge, will be ordered if needed and pt consented. There is no immunization history on file for this patient. Follow-up Information     Follow up With Details Comments Contact Info    Provider Unknown   Patient not available to ask            Time spent in the discharge process was 35 minutes.       Signed By: Guilherme Moore MD     August 25, 2017

## 2017-08-26 LAB
BACTERIA SPEC CULT: NORMAL
SERVICE CMNT-IMP: NORMAL